# Patient Record
Sex: FEMALE | Race: WHITE | Employment: OTHER | URBAN - METROPOLITAN AREA
[De-identification: names, ages, dates, MRNs, and addresses within clinical notes are randomized per-mention and may not be internally consistent; named-entity substitution may affect disease eponyms.]

---

## 2017-01-18 DIAGNOSIS — D51.9 B12 DEFICIENCY ANEMIA: Primary | ICD-10-CM

## 2017-01-18 PROCEDURE — 96372 THER/PROPH/DIAG INJ SC/IM: CPT | Performed by: FAMILY MEDICINE

## 2017-01-18 NOTE — NURSING NOTE
The following medication was given:     MEDICATION: Vitamin B12  1000 mcg  ROUTE: IM  SITE: Deltoid - Right  DOSE: 1 mL  LOT #: [id number]  :  [facility] [place]  EXPIRATION DATE:  11/2017  NDC#: 9559-2366-76  Cali Matthews MA

## 2017-02-15 DIAGNOSIS — D51.9 B12 DEFICIENCY ANEMIA: Primary | ICD-10-CM

## 2017-02-15 PROCEDURE — 96372 THER/PROPH/DIAG INJ SC/IM: CPT | Performed by: FAMILY MEDICINE

## 2017-02-15 NOTE — NURSING NOTE
The following medication was given:     MEDICATION: Vitamin B12  1000mcg  ROUTE: IM  SITE: Deltoid - Left  DOSE: 1000mcg  LOT #: [id number]  :  [facility] [place]  EXPIRATION DATE:  11/2017  NDC#: 0162-2601-87

## 2017-03-13 DIAGNOSIS — Z76.0 ENCOUNTER FOR MEDICATION REFILL: ICD-10-CM

## 2017-03-13 RX ORDER — LEVOTHYROXINE SODIUM 50 UG/1
50 TABLET ORAL DAILY
Qty: 90 TABLET | Refills: 1 | Status: SHIPPED | OUTPATIENT
Start: 2017-03-13 | End: 2017-09-08

## 2017-03-15 DIAGNOSIS — D51.9 ANEMIA DUE TO VITAMIN B12 DEFICIENCY, UNSPECIFIED B12 DEFICIENCY TYPE: Primary | ICD-10-CM

## 2017-03-15 PROCEDURE — 96372 THER/PROPH/DIAG INJ SC/IM: CPT | Performed by: FAMILY MEDICINE

## 2017-03-15 NOTE — NURSING NOTE
The following medication was given:     MEDICATION: Vitamin B12  1000 mcg  ROUTE: IM  SITE: Deltoid - Right  DOSE: 1 mL  LOT #: [id number]  :  [facility] [place]  EXPIRATION DATE:  03/2018  NDC#: 4423-3992-72  Cali Matthews MA

## 2017-05-18 DIAGNOSIS — D51.9 ANEMIA DUE TO VITAMIN B12 DEFICIENCY, UNSPECIFIED B12 DEFICIENCY TYPE: Primary | ICD-10-CM

## 2017-05-18 PROCEDURE — 96372 THER/PROPH/DIAG INJ SC/IM: CPT | Performed by: FAMILY MEDICINE

## 2017-05-18 NOTE — NURSING NOTE
The following medication was given:     MEDICATION: Vitamin B12  1000mcg  ROUTE: IM  SITE: Deltoid - Right  DOSE: 1000mcg/1mL  LOT #: [id number]  :  [facility] [place]  EXPIRATION DATE:  03/2018  NDC#: 8280-9390-48

## 2017-06-20 DIAGNOSIS — D51.9 ANEMIA DUE TO VITAMIN B12 DEFICIENCY, UNSPECIFIED B12 DEFICIENCY TYPE: ICD-10-CM

## 2017-06-20 PROCEDURE — 96372 THER/PROPH/DIAG INJ SC/IM: CPT | Performed by: FAMILY MEDICINE

## 2017-06-20 NOTE — NURSING NOTE
The following medication was given:     MEDICATION: Vitamin B12  1000 mcg  ROUTE: IM  SITE: Deltoid - Left  DOSE: 1 mL  LOT #: [id number]  :  [facility] [place]  EXPIRATION DATE:  MAR 2018  NDC#: 0001-2275-10    Brittany Trevizo CMA

## 2017-06-26 DIAGNOSIS — I10 BENIGN ESSENTIAL HYPERTENSION: Primary | ICD-10-CM

## 2017-06-26 DIAGNOSIS — Z76.0 ENCOUNTER FOR MEDICATION REFILL: ICD-10-CM

## 2017-06-27 RX ORDER — METOPROLOL SUCCINATE 50 MG/1
50 TABLET, EXTENDED RELEASE ORAL DAILY
Qty: 90 TABLET | Refills: 2 | Status: SHIPPED | OUTPATIENT
Start: 2017-06-27 | End: 2018-04-03

## 2017-07-03 DIAGNOSIS — Z76.0 ENCOUNTER FOR MEDICATION REFILL: ICD-10-CM

## 2017-07-04 RX ORDER — CLOPIDOGREL BISULFATE 75 MG/1
75 TABLET ORAL DAILY
Qty: 90 TABLET | Refills: 0 | Status: SHIPPED | OUTPATIENT
Start: 2017-07-04 | End: 2018-01-02

## 2017-07-18 DIAGNOSIS — D51.9 ANEMIA DUE TO VITAMIN B12 DEFICIENCY, UNSPECIFIED B12 DEFICIENCY TYPE: ICD-10-CM

## 2017-07-18 PROCEDURE — 96372 THER/PROPH/DIAG INJ SC/IM: CPT | Performed by: FAMILY MEDICINE

## 2017-07-18 NOTE — NURSING NOTE
The following medication was given:     MEDICATION: Vitamin B12  1000 mcg  ROUTE: IM  SITE: Deltoid - Right  DOSE: 1 mL  LOT #: [id number]  :  [facility] [place]  EXPIRATION DATE:  03/2018  NDC#: 6102-0934-15  Cali Matthews MA

## 2017-08-21 DIAGNOSIS — D51.9 ANEMIA DUE TO VITAMIN B12 DEFICIENCY, UNSPECIFIED B12 DEFICIENCY TYPE: ICD-10-CM

## 2017-08-21 PROCEDURE — 96372 THER/PROPH/DIAG INJ SC/IM: CPT | Performed by: FAMILY MEDICINE

## 2017-08-21 NOTE — NURSING NOTE
.inj  The following medication was given:     MEDICATION: Vitamin B12  1000mcg  ROUTE: IM  SITE: Deltoid - Left  DOSE: 1.0ml  LOT #: [id number]  :  [facility] [place]  EXPIRATION DATE:  3/2018  NDC#: 7816-2582-54  Aminata Holm MA August 21, 2017 2:00 PM

## 2017-09-01 DIAGNOSIS — E78.2 MIXED HYPERLIPIDEMIA: ICD-10-CM

## 2017-09-01 DIAGNOSIS — D51.9 ANEMIA DUE TO VITAMIN B12 DEFICIENCY, UNSPECIFIED B12 DEFICIENCY TYPE: ICD-10-CM

## 2017-09-01 DIAGNOSIS — E03.9 HYPOTHYROIDISM: ICD-10-CM

## 2017-09-01 DIAGNOSIS — I10 ESSENTIAL HYPERTENSION: Primary | ICD-10-CM

## 2017-09-01 DIAGNOSIS — I10 HTN (HYPERTENSION): ICD-10-CM

## 2017-09-01 PROCEDURE — 84443 ASSAY THYROID STIM HORMONE: CPT | Mod: 90 | Performed by: FAMILY MEDICINE

## 2017-09-01 PROCEDURE — 82607 VITAMIN B-12: CPT | Mod: 90 | Performed by: FAMILY MEDICINE

## 2017-09-01 PROCEDURE — 80048 BASIC METABOLIC PNL TOTAL CA: CPT | Mod: 90 | Performed by: FAMILY MEDICINE

## 2017-09-01 PROCEDURE — 36415 COLL VENOUS BLD VENIPUNCTURE: CPT | Performed by: FAMILY MEDICINE

## 2017-09-01 PROCEDURE — 80061 LIPID PANEL: CPT | Mod: 90 | Performed by: FAMILY MEDICINE

## 2017-09-01 NOTE — LETTER
[facility] [address] [place]  [address]  Phone: [phone]    September 7, 2017      Abbi Crowe  [address]   [facility] [address]              Dear Abbi,    The results from your recent visit showed that all labs look really good.  Continue current medications.      Sincerely,     Darlyn Ching M.D.    Results for orders placed or performed in visit on 09/01/17   Basic Metabolic Panel (8) ([facility])   Result Value Ref Range    Glucose 90 65 - 99 mg/dL    Urea Nitrogen 22 8 - 27 mg/dL    Creatinine 0.94 0.57 - 1.00 mg/dL    eGFR If NonAfricn Am 57 (L) >59 mL/min/1.73    eGFR If Africn Am 66 >59 mL/min/1.73    BUN/Creatinine Ratio 23 12 - 28    Sodium 143 134 - 144 mmol/L    Potassium 4.8 3.5 - 5.2 mmol/L    Chloride 105 96 - 106 mmol/L    Total CO2 23 18 - 28 mmol/L    Calcium 9.6 8.7 - 10.3 mg/dL    Narrative    Performed at:  01 - [facility] [address], [place], [place]  [id number]  : Memo Nair MD, Phone:  [phone]   Lipid Panel ([facility])   Result Value Ref Range    Cholesterol 136 100 - 199 mg/dL    Triglycerides 84 0 - 149 mg/dL    HDL Cholesterol 56 >39 mg/dL    VLDL Cholesterol German 17 5 - 40 mg/dL    LDL Cholesterol Calculated 63 0 - 99 mg/dL    LDL/HDL Ratio 1.1 0.0 - 3.2 ratio units    Narrative    Performed at:  01 - [facility] [address], [place], [place]  [id number]  : Memo Nair MD, Phone:  [phone]   TSH ([facility])   Result Value Ref Range    TSH 3.710 0.450 - 4.500 uIU/mL    Narrative    Performed at:  01 - [facility] [address], [place], [place]  [id number]  : Memo Nair MD, Phone:  [phone]   Vitamin B12 ([facility])   Result Value Ref Range    Vitamin B12 807 211 - 946 pg/mL    Narrative    Performed at:  01 - [facility] [address], [place], [place]  [id number]  : Memo Nair MD, Phone:  [phone]

## 2017-09-02 LAB
BUN SERPL-MCNC: 22 MG/DL (ref 8–27)
BUN/CREATININE RATIO: 23 (ref 12–28)
CALCIUM SERPL-MCNC: 9.6 MG/DL (ref 8.7–10.3)
CHLORIDE SERPLBLD-SCNC: 105 MMOL/L (ref 96–106)
CHOLEST SERPL-MCNC: 136 MG/DL (ref 100–199)
CREAT SERPL-MCNC: 0.94 MG/DL (ref 0.57–1)
EGFR IF AFRICN AM: 66 ML/MIN/1.73
EGFR IF NONAFRICN AM: 57 ML/MIN/1.73
GLUCOSE SERPL-MCNC: 90 MG/DL (ref 65–99)
HDLC SERPL-MCNC: 56 MG/DL
LDL/HDL RATIO: 1.1 RATIO UNITS (ref 0–3.2)
LDLC SERPL CALC-MCNC: 63 MG/DL (ref 0–99)
POTASSIUM SERPL-SCNC: 4.8 MMOL/L (ref 3.5–5.2)
SODIUM SERPL-SCNC: 143 MMOL/L (ref 134–144)
TOTAL CO2: 23 MMOL/L (ref 18–28)
TRIGL SERPL-MCNC: 84 MG/DL (ref 0–149)
TSH BLD-ACNC: 3.71 UIU/ML (ref 0.45–4.5)
VIT B12 SERPL-MCNC: 807 PG/ML (ref 211–946)
VLDLC SERPL CALC-MCNC: 17 MG/DL (ref 5–40)

## 2017-09-08 ENCOUNTER — OFFICE VISIT (OUTPATIENT)
Dept: FAMILY MEDICINE | Facility: CLINIC | Age: 82
End: 2017-09-08

## 2017-09-08 VITALS
TEMPERATURE: 97.8 F | OXYGEN SATURATION: 96 % | WEIGHT: 153 LBS | SYSTOLIC BLOOD PRESSURE: 128 MMHG | HEART RATE: 60 BPM | HEIGHT: 63 IN | BODY MASS INDEX: 27.11 KG/M2 | DIASTOLIC BLOOD PRESSURE: 70 MMHG

## 2017-09-08 DIAGNOSIS — R41.3 MEMORY LOSS: ICD-10-CM

## 2017-09-08 DIAGNOSIS — Z00.00 ROUTINE GENERAL MEDICAL EXAMINATION AT A HEALTH CARE FACILITY: ICD-10-CM

## 2017-09-08 DIAGNOSIS — I49.9 IRREGULAR HEART BEAT: ICD-10-CM

## 2017-09-08 DIAGNOSIS — E78.2 MIXED HYPERLIPIDEMIA: ICD-10-CM

## 2017-09-08 DIAGNOSIS — I10 BENIGN ESSENTIAL HYPERTENSION: ICD-10-CM

## 2017-09-08 DIAGNOSIS — Z23 NEED FOR PROPHYLACTIC VACCINATION AND INOCULATION AGAINST INFLUENZA: Primary | ICD-10-CM

## 2017-09-08 DIAGNOSIS — Z76.0 ENCOUNTER FOR MEDICATION REFILL: ICD-10-CM

## 2017-09-08 DIAGNOSIS — R19.4 CHANGE IN BOWEL HABIT: ICD-10-CM

## 2017-09-08 DIAGNOSIS — Z12.31 ENCOUNTER FOR SCREENING MAMMOGRAM FOR BREAST CANCER: ICD-10-CM

## 2017-09-08 PROCEDURE — G0439 PPPS, SUBSEQ VISIT: HCPCS | Mod: 25 | Performed by: FAMILY MEDICINE

## 2017-09-08 PROCEDURE — 90662 IIV NO PRSV INCREASED AG IM: CPT | Performed by: FAMILY MEDICINE

## 2017-09-08 PROCEDURE — 99214 OFFICE O/P EST MOD 30 MIN: CPT | Mod: 25 | Performed by: FAMILY MEDICINE

## 2017-09-08 PROCEDURE — 93000 ELECTROCARDIOGRAM COMPLETE: CPT | Performed by: FAMILY MEDICINE

## 2017-09-08 PROCEDURE — G0008 ADMIN INFLUENZA VIRUS VAC: HCPCS | Performed by: FAMILY MEDICINE

## 2017-09-08 RX ORDER — ZOLPIDEM TARTRATE 10 MG/1
5 TABLET ORAL
COMMUNITY
End: 2018-10-01

## 2017-09-08 RX ORDER — LEVOTHYROXINE SODIUM 50 UG/1
50 TABLET ORAL DAILY
Qty: 90 TABLET | Refills: 1 | Status: SHIPPED | OUTPATIENT
Start: 2017-09-08 | End: 2018-03-05

## 2017-09-08 NOTE — PROGRESS NOTES
SUBJECTIVE:   Abbi Crowe is a 82 year old female who presents for Preventive Visit.    ISSUES TODAY  1. She is concerned that she had TIA a few weeks ago. She notes that she was driving here to the clinic and temporarily lost memory of driving and almost drove the car through the front of the building. No headache. One prior episode that seemed similar with hospitalization and no conclusion formed. She denies any other neurologic changes.  2. She is concerned about dementia. A few weeks ago she was unable to remember how to turn off her iPad. She uses it every day and could not recall what to do. She has other episodes of memory loss.   3. She has episodes of loss of bowel control that she relates to when she is upset or emotional. She will sometimes wake with urgency to move her bowels.  Are you in the first 12 months of your Medicare Part B coverage?  No    Healthy Habits:    Do you get at least three servings of calcium containing foods daily (dairy, green leafy vegetables, etc.)? yes and no, taking calcium and/or vitamin D supplement: yes     Amount of exercise or daily activities, outside of work: 1 day(s) per week (Pilates type exercise 1x week and walks at [employer] also)    Problems taking medications regularly No    Medication side effects: No    Have you had an eye exam in the past two years? yes    Do you see a dentist twice per year? yes    Do you have sleep apnea, excessive snoring or daytime drowsiness?no    COGNITIVE SCREEN  1) Repeat 3 items (Banana, Sunrise, Chair)    2) Clock draw: NORMAL  3) 3 item recall: Recalls 3 objects  Results: NORMAL clock, 3 items recalled: COGNITIVE IMPAIRMENT LESS LIKELY    Mini-CogTM Copyright TAYLOR Persaud. Licensed by the author for use in [facility]; reprinted with permission (deena@.[facility]). All rights reserved.                Hyperlipidemia Follow-Up      Rate your low fat/cholesterol diet?: good    Taking statin?  Yes, no muscle aches from  statin    Other lipid medications/supplements?:  none    Hypertension Follow-up      Outpatient blood pressures are not being checked.    Low Salt Diet: not monitoring salt    Hypothyroidism Follow-up      Since last visit, patient describes the following symptoms: Weight stable, no hair loss, no skin changes, no constipation, no loose stools            Reviewed and updated as needed this visit by clinical staffTobacco  Allergies  Meds         Reviewed and updated as needed this visit by Provider        Social History   Substance Use Topics     Smoking status: Never Smoker     Smokeless tobacco: Never Used     Alcohol use No       Pt does not drink    Today's PHQ-2 Score:   PHQ-2 ( 1999 [employer]) 9/7/2016 8/28/2015   Q1: Little interest or pleasure in doing things 0 3   Q2: Feeling down, depressed or hopeless 0 1   PHQ-2 Score 0 4         Do you feel safe in your environment - Yes    Do you have a Health Care Directive?: Yes: Patient states has Advance Directive and will bring in a copy to clinic.    Current providers sharing in care for this patient include: Patient Care Team:  Darlyn Ching MD as PCP - General (Family Practice)      Hearing impairment: Yes, pt wears bilateral hearing aids and they are current    Ability to successfully perform activities of daily living: Yes, no assistance needed     Fall risk:  Fall Risk Assessment not completed.    Home safety:  none identified      The following health maintenance items are reviewed in Epic and correct as of today:  Health Maintenance   Topic Date Due     DEXA Q2 YR  08/22/2014     FALL RISK ASSESSMENT  08/28/2016     INFLUENZA VACCINE (SYSTEM ASSIGNED)  09/01/2017     TSH Q1 YEAR  09/01/2018     BMP Q1 YR  09/01/2018     LIPID MONITORING Q1 YEAR  09/01/2018     ADVANCE DIRECTIVE PLANNING Q5 YRS  08/28/2020     TETANUS IMMUNIZATION (SYSTEM ASSIGNED)  08/28/2025     PNEUMOCOCCAL  Completed     Labs reviewed in EPIC  BP Readings from Last 3 Encounters:    09/08/17 128/70   09/07/16 128/70   03/11/16 122/60    Wt Readings from Last 3 Encounters:   09/08/17 69.4 kg (153 lb)   09/07/16 69.9 kg (154 lb)   03/11/16 69.6 kg (153 lb 6.4 oz)                  Patient Active Problem List   Diagnosis     Allergic rhinitis     Essential hypertension     Mixed hyperlipidemia     B12 deficiency anemia     Preventative health care     Health Care Home     Advanced directives, counseling/discussion     HTN (hypertension)     Dyslipidemia     Hypothyroidism     Aortic valve prosthesis present     Past Surgical History:   Procedure Laterality Date     AORTIC VALVE REPLACEMENT      1 vessel CABG     D & C       PHACOEMULSIFICATION CLEAR CORNEA WITH STANDARD INTRAOCULAR LENS IMPLANT  6/6/2012    Procedure:PHACOEMULSIFICATION CLEAR CORNEA WITH STANDARD INTRAOCULAR LENS IMPLANT; LEFT PHACOEMULSIFICATION CLEAR CORNEA WITH STANDARD INTRAOCULAR LENS IMPLANT ; Surgeon:LUIS MANCINI; Location:[facility]       Social History   Substance Use Topics     Smoking status: Never Smoker     Smokeless tobacco: Never Used     Alcohol use No     Family History   Problem Relation Age of Onset     HEART DISEASE Mother      CHF     HEART DISEASE Father      Endocrine Disease Brother      CANCER Sister      esophageal      HEART DISEASE Brother      OSTEOPOROSIS Sister          Current Outpatient Prescriptions   Medication Sig Dispense Refill     zolpidem (AMBIEN) 10 MG tablet Take 5 mg by mouth nightly as needed for sleep       clopidogrel (PLAVIX) 75 MG tablet Take 1 tablet (75 mg) by mouth daily 90 tablet 0     metoprolol (TOPROL-XL) 50 MG 24 hr tablet Take 1 tablet (50 mg) by mouth daily 90 tablet 2     levothyroxine (SYNTHROID/LEVOTHROID) 50 MCG tablet Take 1 tablet (50 mcg) by mouth daily 90 tablet 1     Azelastine HCl 0.15 % SOLN Spray 2 sprays in nostril 2 times daily  3     Lactobacillus (ACIDOPHILUS) CAPS Take 1 capsule by mouth daily       Cholecalciferol (VITAMIN D3 PO) Take 2,000 Units by mouth  daily       atorvastatin (LIPITOR) 20 MG tablet Take 20 mg by mouth daily       cyanocobalamin 1000 MCG/ML injection Inject 1 mL (1,000 mcg) into the muscle every 30 days (Patient taking differently: Inject 1 mL into the muscle every 30 days Last received 3/7/16) 3 mL 2     multivitamin (OCUVITE) TABS Take 1 tablet by mouth daily.       aspirin 81 MG tablet Take 1 tablet by mouth daily.       calcium-vitamin D (CALTRATE 600+D) 600-400 MG-UNIT per tablet Take 1 tablet by mouth 2 times daily.       loratadine (CLARITIN) 10 MG tablet Take 10 mg by mouth daily       ALLERGY SHOTS Once a month or PRN       Allergies   Allergen Reactions     Yellow Dye Itching     Recent Labs   Lab Test  09/01/17   0905  08/31/16   0911   11/19/15   1018  08/21/15   1049   08/19/14   1132  08/21/12   LDL  63  75   --   63  73   < >  66   < >  64   HDL  56  53   --   51  52   < >  56   < >  52   TRIG  84  103   --   103  99   < >  90   < >  104   ALT   --   19   --    --   10   --   15   < >   --    CR  0.94  0.99   < >   --   1.02*   --   0.92   < >   --    POTASSIUM  4.8  5.0   < >   --   5.0   --   4.7   < >   --    TSH   --    --    --    --    --    --    --    --   2.20    < > = values in this interval not displayed.              Mammogram Screening: Patient over age 75, has elected to continue with mammography screening.    ROS:  C: NEGATIVE for fever, chills, change in weight  I: NEGATIVE for worrisome rashes, moles or lesions  E: NEGATIVE for vision changes or irritation  E/M: NEGATIVE for ear, mouth and throat problems  R: NEGATIVE for significant cough or SOB  B: NEGATIVE for masses, tenderness or discharge  CV: NEGATIVE for chest pain, palpitations or peripheral edema  GI: NEGATIVE for nausea, abdominal pain, heartburn, or change in bowel habits  : NEGATIVE for frequency, dysuria, or hematuria  M: NEGATIVE for significant arthralgias or myalgia  N: POSITIVE as above  E: NEGATIVE for temperature intolerance, skin/hair  "changes  H: NEGATIVE for bleeding problems  P: NEGATIVE for changes in mood or affect  PSYCHIATRIC: she is concerned about dementia. She notes a sister with bad memory issues. She has trouble remembering things.     OBJECTIVE:   /70 (Cuff Size: Adult Regular)  Pulse 60  Temp 97.8  F (36.6  C) (Oral)  Ht 1.588 m (5' 2.5\")  Wt 69.4 kg (153 lb)  SpO2 96%  BMI 27.54 kg/m2 Estimated body mass index is 27.54 kg/(m^2) as calculated from the following:    Height as of this encounter: 1.588 m (5' 2.5\").    Weight as of this encounter: 69.4 kg (153 lb).  EXAM:   GENERAL APPEARANCE: healthy, alert and no distress  EYES: Eyes grossly normal to inspection, PERRL and conjunctivae and sclerae normal  HENT: ear canals and TM's normal, nose and mouth without ulcers or lesions, oropharynx clear and oral mucous membranes moist  NECK: no adenopathy, no asymmetry, masses, or scars and thyroid normal to palpation  RESP: lungs clear to auscultation - no rales, rhonchi or wheezes  BREAST: normal without masses, tenderness or nipple discharge and no palpable axillary masses or adenopathy  CV: regular rate and rhythm, normal S1 S2, no S3 or S4, no murmur, click or rub, no peripheral edema and peripheral pulses strong  ABDOMEN: soft, nontender, no hepatosplenomegaly, no masses and bowel sounds normal   (female): normal female external genitalia, normal urethral meatus, vaginal mucosal atrophy noted, normal cervix, adnexae, and uterus without masses or abnormal discharge  MS: no musculoskeletal defects are noted and gait is age appropriate without ataxia  SKIN: no suspicious lesions or rashes  NEURO: Normal strength and tone, sensory exam grossly normal, mentation intact and speech normal  PSYCH: affect normal  EKG: sinus rhythm, LBBB      ASSESSMENT / PLAN:   Abbi was seen today for physical and flu shot.    Diagnoses and all orders for this visit:    Need for prophylactic vaccination and inoculation against influenza  -    " " FLU VACCINE, INCREASED ANTIGEN, PRESV FREE, AGE 65+ [72616]  -     ADMIN INFLUENZA (For MEDICARE Patients ONLY) []    Routine general medical examination at a health care facility    Mixed hyperlipidemia  Continue atorvastatin.     Change in bowel habit  If persistent, consider repeat colonoscopy.    Memory loss  -     Referral to [facility] for head CT and carotid ultrasound. If normal, treat for presumed Alzheimer's dementia.     Benign essential hypertension  Continue metoprolol.     Irregular heart beat  -     EKG 12-lead complete w/read - Clinics  Sinus rhythm with LBBB, no previous EKG available for comparison.   She is scheduled to see cardiology next week.     Encounter for screening mammogram for breast cancer  -     MAMMO -  Screening Digital Bilateral (FUTURE/[facility]); Future        End of Life Planning:  Patient currently has an advanced directive: No.  I have verified the patient's ablity to prepare an advanced directive/make health care decisions.  Literature was provided to assist patient in preparing an advanced directive.    COUNSELING:  Reviewed preventive health counseling, as reflected in patient instructions       Regular exercise       Healthy diet/nutrition       Vision screening       Hearing screening       Osteoporosis Prevention/Bone Health       Colon cancer screening        Estimated body mass index is 27.54 kg/(m^2) as calculated from the following:    Height as of this encounter: 1.588 m (5' 2.5\").    Weight as of this encounter: 69.4 kg (153 lb).     reports that she has never smoked. She has never used smokeless tobacco.        Appropriate preventive services were discussed with this patient, including applicable screening as appropriate for cardiovascular disease, diabetes, osteopenia/osteoporosis, and glaucoma.  As appropriate for age/gender, discussed screening for colorectal cancer, prostate cancer, breast cancer, and cervical cancer. Checklist reviewing " preventive services available has been given to the patient.    Reviewed patients plan of care and provided an AVS. The Basic Care Plan (routine screening as documented in Health Maintenance) for Abbi meets the Care Plan requirement. This Care Plan has been established and reviewed with the Patient.    Counseling Resources:  ATP IV Guidelines  Pooled Cohorts Equation Calculator  Breast Cancer Risk Calculator  FRAX Risk Assessment  ICSI Preventive Guidelines  Dietary Guidelines for Americans, 2010  [employer]'s MyPlate  ASA Prophylaxis  Lung CA Screening    Darlyn Ching MD  [facility] Psych[facility] HospitalInjectable Influenza Immunization Documentation    1.  Are you sick today? (Fever of 100.5 or higher on the day of the clinic)   No    2.  Have you ever had Guillain-Westminster Syndrome within 6 weeks of an influenza vaccionation?  No    3. Do you have a life-threatening allergy to eggs?  No    4. Do you have a life-threatening allergy to a component of the vaccine? May include antibiotics, gelatin or latex.  No     5. Have you ever had a reaction to a dose of flu vaccine that needed immediate medical attention?  No     Form completed by Paula Peng CMA

## 2017-09-08 NOTE — MR AVS SNAPSHOT
After Visit Summary   9/8/2017    Abbi Crowe    MRN: [id number]           Patient Information     Date Of Birth          1935        Visit Information        Provider Department      9/8/2017 1:15 PM Darlyn Ching MD [facility]        Today's Diagnoses     Need for prophylactic vaccination and inoculation against influenza    -  1    Routine general medical examination at a health care facility        Mixed hyperlipidemia        Change in bowel habit        Memory loss        Benign essential hypertension        Irregular heart beat        Encounter for screening mammogram for breast cancer          Care Instructions      Preventive Health Recommendations  Female Ages 65 +    Yearly exam:     See your health care provider every year in order to  o Review health changes.   o Discuss preventive care.    o Review your medicines if your doctor has prescribed any.      You no longer need a yearly Pap test unless you've had an abnormal Pap test in the past 10 years. If you have vaginal symptoms, such as bleeding or discharge, be sure to talk with your provider about a Pap test.      Every 1 to 2 years, have a mammogram.  If you are over 69, talk with your health care provider about whether or not you want to continue having screening mammograms.      Every 10 years, have a colonoscopy. Or, have a yearly FIT test (stool test). These exams will check for colon cancer.       Have a cholesterol test every 5 years, or more often if your doctor advises it.       Have a diabetes test (fasting glucose) every three years. If you are at risk for diabetes, you should have this test more often.       At age 65, have a bone density scan (DEXA) to check for osteoporosis (brittle bone disease).    Shots:    Get a flu shot each year.    Get a tetanus shot every 10 years.    Talk to your doctor about your pneumonia vaccines. There are now two you should receive - Pneumovax (PPSV 23) and  Prevnar (PCV 13).    Talk to your doctor about the shingles vaccine.    Talk to your doctor about the hepatitis B vaccine.    Nutrition:     Eat at least 5 servings of fruits and vegetables each day.      Eat whole-grain bread, whole-wheat pasta and brown rice instead of white grains and rice.      Talk to your provider about Calcium and Vitamin D.     Lifestyle    Exercise at least 150 minutes a week (30 minutes a day, 5 days a week). This will help you control your weight and prevent disease.      Limit alcohol to one drink per day.      No smoking.       Wear sunscreen to prevent skin cancer.       See your dentist twice a year for an exam and cleaning.      See your eye doctor every 1 to 2 years to screen for conditions such as glaucoma, macular degeneration, cataracts, etc   Preventive Health Recommendations    Female Ages 65 +    Yearly exam:   See your health care provider every year in order to  Review health changes.   Discuss preventive care.    Review your medicines if your doctor has prescribed any.    You no longer need a yearly Pap test unless you've had an abnormal Pap test in the past 10 years. If you have vaginal symptoms, such as bleeding or discharge, be sure to talk with your provider about a Pap test.    Every 1 to 2 years, have a mammogram.  If you are over 69, talk with your health care provider about whether or not you want to continue having screening mammograms.    Every 10 years, have a colonoscopy. Or, have a yearly FIT test (stool test). These exams will check for colon cancer.     Have a cholesterol test every 5 years, or more often if your doctor advises it.     Have a diabetes test (fasting glucose) every three years. If you are at risk for diabetes, you should have this test more often.     At age 65, have a bone density scan (DEXA) to check for osteoporosis (brittle bone disease).    Shots:  Get a flu shot each year.  Get a tetanus shot every 10 years.  Talk to your doctor about  your pneumonia vaccines. There are now two you should receive - Pneumovax (PPSV 23) and Prevnar (PCV 13).  Talk to your doctor about the shingles vaccine.  Talk to your doctor about the hepatitis B vaccine.    Nutrition:   Eat at least 5 servings of fruits and vegetables each day.    Eat whole-grain bread, whole-wheat pasta and brown rice instead of white grains and rice.    Talk to your provider about Calcium and Vitamin D.     Lifestyle  Exercise at least 150 minutes a week (30 minutes a day, 5 days a week). This will help you control your weight and prevent disease.    Limit alcohol to one drink per day.    No smoking.     Wear sunscreen to prevent skin cancer.     See your dentist twice a year for an exam and cleaning.    See your eye doctor every 1 to 2 years to screen for conditions such as glaucoma, macular degeneration and cataracts.          Follow-ups after your visit        Additional Services     [facility]       Referral to [facility]  Phone: [phone]  Fax: [phone]  Reason for referral: CT OF BRAIN AND CAROTID U/S                  Your next 10 appointments already scheduled     Sep 13, 2017  1:00 PM CDT   MA SCREENING DIGITAL BILATERAL with SHBCMA6   [facility] ([facility])    [address] [place] [address], [address] [place] [address] [phone]           Do not use any powder, lotion or deodorant under your arms or on your breast. If you do, we will ask you to remove it before your exam.  Wear comfortable, two-piece clothing.  If you have any allergies, tell your care team.  Bring any previous mammograms from other facilities or have them mailed to the breast center. Three-dimensional (3D) mammograms are available at [place] locations in [facility], and [place]. Health locations include [facility] in [place]. Benefits of 3D mammograms include: -  "Improved rate of cancer detection - Decreases your chance of having to go back for more tests, which means fewer: - \"False-positive\" results (This means that there is an abnormal area but it isn't cancer.) - Invasive testing procedures, such as a biopsy or surgery - Can provide clearer images of the breast if you have dense breast tissue. 3D mammography is an optional exam that anyone can have with a 2D mammogram. It doesn't replace or take the place of a 2D mammogram. 2D mammograms remain an effective screening test for all women.  Not all insurance companies cover the cost of a 3D mammogram. Check with your insurance.            Sep 21, 2017  1:30 PM CDT   LAB with [employer] [facility]   [facility] ([facility])    [address] [place] [address] [phone]           Patient must bring picture ID. Patient should be prepared to give a urine specimen  Please do not eat 10-12 hours before your appointment if you are coming in fasting for labs on lipids, cholesterol, or glucose (sugar). Pregnant women should follow their Care Team instructions. Water with medications is okay. Do not drink coffee or other fluids. If you have concerns about taking  your medications, please ask at office or if scheduling via [employer], send a message by clicking on Secure Messaging, Message Your Care Team.            Oct 20, 2017  9:45 AM CDT   LAB with [facility] LAB   [facility] ([facility])    [address] [place] [id number] [phone]           Patient must bring picture ID. Patient should be prepared to give a urine specimen  Please do not eat 10-12 hours before your appointment if you are coming in fasting for labs on lipids, cholesterol, or glucose (sugar). Pregnant women should follow their Care Team instructions. Water with medications is okay. Do not drink coffee or other fluids. If you have concerns about taking  your medications, please ask at office or if " scheduling via [employer], send a message by clicking on Secure Messaging, Message Your Care Team.            Nov 21, 2017  1:30 PM CST   [facility] with [facility]   [facility]    [address] [place] [address] [phone]           Patient must bring picture ID. Patient should be prepared to give a urine specimen  Please do not eat 10-12 hours before your appointment if you are coming in fasting for labs on lipids, cholesterol, or glucose (sugar). Pregnant women should follow their Care Team instructions. Water with medications is okay. Do not drink coffee or other fluids. If you have concerns about taking  your medications, please ask at office or if scheduling via [employer], send a message by clicking on Secure Messaging, Message Your Care Team.            Dec 21, 2017  1:30 PM CST   LAB with [facility] LAB   [facility]    [address] [place] [address] [phone]           Patient must bring picture ID. Patient should be prepared to give a urine specimen  Please do not eat 10-12 hours before your appointment if you are coming in fasting for labs on lipids, cholesterol, or glucose (sugar). Pregnant women should follow their Care Team instructions. Water with medications is okay. Do not drink coffee or other fluids. If you have concerns about taking  your medications, please ask at office or if scheduling via [employer], send a message by clicking on Secure Messaging, Message Your Care Team.              Future tests that were ordered for you today     Open Future Orders        Priority Expected Expires Ordered    MAMMO -  Screening Digital Bilateral (FUTURE/[facility]) Routine  9/8/2018 9/8/2017            Who to contact     If you have questions or need follow up information about today's clinic visit or your schedule please contact [facility] directly at [phone].  Normal or non-critical lab and imaging  "results will be communicated to you by MyChart, letter or phone within 4 business days after the clinic has received the results. If you do not hear from us within 7 days, please contact the clinic through [employer] or phone. If you have a critical or abnormal lab result, we will notify you by phone as soon as possible.  Submit refill requests through [employer] or call your pharmacy and they will forward the refill request to us. Please allow 3 business days for your refill to be completed.          Additional Information About Your Visit        [employer] Information     [employer] lets you send messages to your doctor, view your test results, renew your prescriptions, schedule appointments and more. To sign up, go to [email or web address] . Click on \"Log in\" on the left side of the screen, which will take you to the Welcome page. Then click on \"Sign up Now\" on the right side of the page.     You will be asked to enter the access code listed below, as well as some personal information. Please follow the directions to create your username and password.     Your access code is: [email or web address]  Expires: 2017  5:49 PM     Your access code will  in 90 days. If you need help or a new code, please call your [place] clinic or [phone].        Care EveryWhere ID     This is your Care EveryWhere ID. This could be used by other organizations to access your [place] medical records  [id number]        Your Vitals Were     Pulse Temperature Height Pulse Oximetry BMI (Body Mass Index)       60 97.8  F (36.6  C) (Oral) 1.588 m (5' 2.5\") 96% 27.54 kg/m2        Blood Pressure from Last 3 Encounters:   17 128/70   16 128/70   16 122/60    Weight from Last 3 Encounters:   17 69.4 kg (153 lb)   16 69.9 kg (154 lb)   16 69.6 kg (153 lb 6.4 oz)              We Performed the Following     ADMIN INFLUENZA (For MEDICARE Patients ONLY) []     EKG 12-lead complete w/read - Clinics     FLU " VACCINE, INCREASED ANTIGEN, PRESV FREE, AGE 65+ [83451]     [facility]          Today's Medication Changes          These changes are accurate as of: 9/8/17  5:49 PM.  If you have any questions, ask your nurse or doctor.               These medicines have changed or have updated prescriptions.        Dose/Directions    cyanocobalamin 1000 MCG/ML injection   Commonly known as:  VITAMIN B12   This may have changed:  additional instructions   Used for:  Vitamin B12 deficiency (non anemic)        Dose:  1 mL   Inject 1 mL (1,000 mcg) into the muscle every 30 days   Quantity:  3 mL   Refills:  2                Primary Care Provider Office Phone # Fax #    Darlyn Teresa Ching [phone]       [facility] [address]  [facility] [address]        Equal Access to Services     MIGNON CASTILLO AH: Hadii jewels atwood hadasho Soomaali, waaxda luqadaha, qaybta kaalmada adeegyada, dagmar serrano . So [facility] [phone].    ATENCIÓN: Si habla español, tiene a shaikh disposición servicios gratuitos de asistencia lingüística. Llame al [phone].    We comply with applicable federal civil rights laws and [place] laws. We do not discriminate on the basis of race, color, national origin, age, disability sex, sexual orientation or gender identity.            Thank you!     Thank you for choosing [facility]  for your care. Our goal is always to provide you with excellent care. Hearing back from our patients is one way we can continue to improve our services. Please take a few minutes to complete the written survey that you may receive in the mail after your visit with us. Thank you!             Your Updated Medication List - Protect others around you: Learn how to safely use, store and throw away your medicines at www.disposemymeds.org.          This list is accurate as of: 9/8/17  5:49 PM.  Always use your most recent med list.                   Brand Name  Dispense Instructions for use Diagnosis    acidophilus Caps      Take 1 capsule by mouth daily    Slow transit constipation       ALLERGEN IMMUNOTHERAPY PRESCRIPTION      Once a month or PRN    Allergic rhinitis, cause unspecified       aspirin 81 MG tablet      Take 1 tablet by mouth daily.        atorvastatin 20 MG tablet    LIPITOR     Take 20 mg by mouth daily        Azelastine HCl 0.15 % Soln      Spray 2 sprays in nostril 2 times daily        CALTRATE 600+D 600-400 MG-UNIT per tablet   Generic drug:  calcium-vitamin D      Take 1 tablet by mouth 2 times daily.        clopidogrel 75 MG tablet    PLAVIX    90 tablet    Take 1 tablet (75 mg) by mouth daily    Encounter for medication refill       cyanocobalamin 1000 MCG/ML injection    VITAMIN B12    3 mL    Inject 1 mL (1,000 mcg) into the muscle every 30 days    Vitamin B12 deficiency (non anemic)       levothyroxine 50 MCG tablet    SYNTHROID/LEVOTHROID    90 tablet    Take 1 tablet (50 mcg) by mouth daily    Encounter for medication refill       loratadine 10 MG tablet    CLARITIN     Take 10 mg by mouth daily        metoprolol 50 MG 24 hr tablet    TOPROL-XL    90 tablet    Take 1 tablet (50 mg) by mouth daily    Benign essential hypertension       multivitamin Tabs tablet      Take 1 tablet by mouth daily.        VITAMIN D3 PO      Take 2,000 Units by mouth daily        zolpidem 10 MG tablet    AMBIEN     Take 5 mg by mouth nightly as needed for sleep

## 2017-09-13 ENCOUNTER — TRANSFERRED RECORDS (OUTPATIENT)
Dept: FAMILY MEDICINE | Facility: CLINIC | Age: 82
End: 2017-09-13

## 2017-09-13 ENCOUNTER — HOSPITAL ENCOUNTER (OUTPATIENT)
Dept: MAMMOGRAPHY | Facility: CLINIC | Age: 82
Discharge: HOME | End: 2017-09-13
Attending: FAMILY MEDICINE | Admitting: FAMILY MEDICINE
Payer: MEDICARE

## 2017-09-13 DIAGNOSIS — Z12.31 ENCOUNTER FOR SCREENING MAMMOGRAM FOR BREAST CANCER: ICD-10-CM

## 2017-09-13 PROCEDURE — G0202 SCR MAMMO BI INCL CAD: HCPCS

## 2017-09-15 ENCOUNTER — TELEPHONE (OUTPATIENT)
Dept: FAMILY MEDICINE | Facility: CLINIC | Age: 82
End: 2017-09-15

## 2017-09-15 DIAGNOSIS — R41.3 MEMORY CHANGES: Primary | ICD-10-CM

## 2017-09-15 RX ORDER — MEMANTINE HYDROCHLORIDE 5 MG/1
5 TABLET ORAL DAILY
Qty: 30 TABLET | Refills: 1 | Status: SHIPPED | OUTPATIENT
Start: 2017-09-15 | End: 2017-10-24

## 2017-09-15 NOTE — TELEPHONE ENCOUNTER
----- Message from Shahla Neri MA sent at 9/14/2017  1:38 PM CDT -----  Regarding: please call patient with new med  Thanks! Sarah  ----- Message -----     From: Darlyn Ching MD     Sent: 9/13/2017   4:27 PM       To: [facility] Triage    No evidence of stroke.  It would be appropriate for trial of Alzheimer's medication.  Namenda 5 mg BID #60 1 RF, recheck in 6 weeks.

## 2017-09-15 NOTE — TELEPHONE ENCOUNTER
Patient returned phone call for results.  Negative results CT given-per Dr. Ching discuss adding Namenda 5mg BID for memeory-patient agrees and will start this.  Also patient given results carotid artery U/S.  Negative mammo results also given.  Patient advised to RTC in 6 weeks for recheck memory, she will call to schedule this.  Courtney Viera

## 2017-09-21 DIAGNOSIS — D51.9 ANEMIA DUE TO VITAMIN B12 DEFICIENCY, UNSPECIFIED B12 DEFICIENCY TYPE: ICD-10-CM

## 2017-09-21 PROCEDURE — 96372 THER/PROPH/DIAG INJ SC/IM: CPT | Performed by: FAMILY MEDICINE

## 2017-10-20 ENCOUNTER — OFFICE VISIT (OUTPATIENT)
Dept: FAMILY MEDICINE | Facility: CLINIC | Age: 82
End: 2017-10-20

## 2017-10-20 VITALS
HEART RATE: 64 BPM | DIASTOLIC BLOOD PRESSURE: 80 MMHG | WEIGHT: 153 LBS | BODY MASS INDEX: 27.54 KG/M2 | OXYGEN SATURATION: 96 % | SYSTOLIC BLOOD PRESSURE: 130 MMHG

## 2017-10-20 DIAGNOSIS — R30.0 DYSURIA: Primary | ICD-10-CM

## 2017-10-20 DIAGNOSIS — D51.9 ANEMIA DUE TO VITAMIN B12 DEFICIENCY, UNSPECIFIED B12 DEFICIENCY TYPE: ICD-10-CM

## 2017-10-20 LAB
BACTERIA URINE: NORMAL
BILIRUB UR QL STRIP: NORMAL
BLOOD URINE DIP: NORMAL
CASTS/LPF: NORMAL
COLOR UR: YELLOW
CRYSTAL URINE: NORMAL
EPITHELIAL CELLS - QUEST: NORMAL
GLUCOSE UR STRIP-MCNC: 0 MG/DL
KETONES UR QL STRIP: NORMAL
LEUKOCYTE ESTERASE URINE DIP: NORMAL
MUCOUS URINE: NORMAL
NITRITE UR QL STRIP: NORMAL
PH UR STRIP: 6 PH (ref 5–9)
PROT UR QL: NORMAL MG/DL (ref ?–0.01)
RBC URINE: NORMAL (ref 0–3)
SP GR UR STRIP: 1.02 (ref 1–1.02)
UROBILINOGEN UR QL STRIP: 1 EU/DL (ref 0.2–1)
WBC URINE: NORMAL (ref 0–3)

## 2017-10-20 PROCEDURE — 96372 THER/PROPH/DIAG INJ SC/IM: CPT | Performed by: FAMILY MEDICINE

## 2017-10-20 PROCEDURE — 81003 URINALYSIS AUTO W/O SCOPE: CPT | Performed by: FAMILY MEDICINE

## 2017-10-20 PROCEDURE — 99213 OFFICE O/P EST LOW 20 MIN: CPT | Mod: 25 | Performed by: FAMILY MEDICINE

## 2017-10-20 NOTE — MR AVS SNAPSHOT
After Visit Summary   10/20/2017    Abbi Crowe    MRN: [id number]           Patient Information     Date Of Birth          1935        Visit Information        Provider Department      10/20/2017 8:15 AM Cristian Ramirez MD [facility]        Today's Diagnoses     Dysuria    -  1    Anemia due to vitamin B12 deficiency, unspecified B12 deficiency type           Follow-ups after your visit        Your next 10 appointments already scheduled     Oct 24, 2017  9:45 AM CDT   Office Visit with Darlyn Ching MD   [facility] ([facility])    [address] [place] [id number] [phone]           Bring a current list of meds and any records pertaining to this visit. For Physicals, please bring immunization records and any forms needing to be filled out. Please arrive 10 minutes early to complete paperwork.            Nov 21, 2017  1:30 PM CST   LAB with RF LAB   [facility] ([facility])    [address] [place] [address] [phone]           Patient must bring picture ID. Patient should be prepared to give a urine specimen  Please do not eat 10-12 hours before your appointment if you are coming in fasting for labs on lipids, cholesterol, or glucose (sugar). Pregnant women should follow their Care Team instructions. Water with medications is okay. Do not drink coffee or other fluids. If you have concerns about taking  your medications, please ask at office or if scheduling via [employer], send a message by clicking on Secure Messaging, Message Your Care Team.            Dec 21, 2017  1:30 PM CST   LAB with RF LAB   [facility] ([facility])    [address] [place] [id number] [phone]           Patient must bring picture ID. Patient should be prepared to give a urine specimen  Please do not eat 10-12 hours before your appointment if you are coming in  "fasting for labs on lipids, cholesterol, or glucose (sugar). Pregnant women should follow their Care Team instructions. Water with medications is okay. Do not drink coffee or other fluids. If you have concerns about taking  your medications, please ask at office or if scheduling via Shipping Easy, send a message by clicking on Secure Messaging, Message Your Care Team.              Who to contact     If you have questions or need follow up information about today's clinic visit or your schedule please contact [facility] directly at [phone].  Normal or non-critical lab and imaging results will be communicated to you by Shipping Easy, letter or phone within 4 business days after the clinic has received the results. If you do not hear from us within 7 days, please contact the clinic through Shipping Easy or phone. If you have a critical or abnormal lab result, we will notify you by phone as soon as possible.  Submit refill requests through [employer] Easy or call your pharmacy and they will forward the refill request to us. Please allow 3 business days for your refill to be completed.          Additional Information About Your Visit        Shipping Easy Information     Shipping Easy lets you send messages to your doctor, view your test results, renew your prescriptions, schedule appointments and more. To sign up, go to [email or web address] . Click on \"Log in\" on the left side of the screen, which will take you to the Welcome page. Then click on \"Sign up Now\" on the right side of the page.     You will be asked to enter the access code listed below, as well as some personal information. Please follow the directions to create your username and password.     Your access code is: [email or web address]  Expires: 2017  5:49 PM     Your access code will  in 90 days. If you need help or a new code, please call your [place] clinic or [phone].        Care EveryWhere ID     This is your Care EveryWhere ID. This could be used by other " organizations to access your [place] medical records  [id number]        Your Vitals Were     Pulse Pulse Oximetry BMI (Body Mass Index)             64 96% 27.54 kg/m2          Blood Pressure from Last 3 Encounters:   10/20/17 130/80   09/08/17 128/70   09/07/16 128/70    Weight from Last 3 Encounters:   10/20/17 69.4 kg (153 lb)   09/08/17 69.4 kg (153 lb)   09/07/16 69.9 kg (154 lb)              We Performed the Following     THER/PROPH/DIAG INJ, SC/IM     Urinalysis w/reflex protein, bili ([facility])     Urine Culture  Routine ([facility])     VITAMIN B12 INJ /1000MCG  (** ADD QUANTITY **)          Today's Medication Changes          These changes are accurate as of: 10/20/17  1:06 PM.  If you have any questions, ask your nurse or doctor.               These medicines have changed or have updated prescriptions.        Dose/Directions    cyanocobalamin 1000 MCG/ML injection   Commonly known as:  VITAMIN B12   This may have changed:  additional instructions   Used for:  Vitamin B12 deficiency (non anemic)        Dose:  1 mL   Inject 1 mL (1,000 mcg) into the muscle every 30 days   Quantity:  3 mL   Refills:  2                Primary Care Provider Office Phone # Fax #    Darlyn Teresa Ching [phone]       [facility] [address] [facility] [address]        Equal Access to Services     MIGNON CASTILLO AH: Hadii jewels atwood hadasho Sosaravanan, waaxda luqadaha, qaybta kaalmada adeegyada, dagmar serrano . So [facility] [phone].    ATENCIÓN: Si habla español, tiene a shaikh disposición servicios gratuitos de asistencia lingüística. Llame al [phone].    We comply with applicable federal civil rights laws and [place] laws. We do not discriminate on the basis of race, color, national origin, age, disability, sex, sexual orientation, or gender identity.            Thank you!     Thank you for choosing [facility]  for your care. Our goal is always to provide you  with excellent care. Hearing back from our patients is one way we can continue to improve our services. Please take a few minutes to complete the written survey that you may receive in the mail after your visit with us. Thank you!             Your Updated Medication List - Protect others around you: Learn how to safely use, store and throw away your medicines at www.disposemymeds.org.          This list is accurate as of: 10/20/17  1:06 PM.  Always use your most recent med list.                   Brand Name Dispense Instructions for use Diagnosis    acidophilus Caps      Take 1 capsule by mouth daily    Slow transit constipation       ALLERGEN IMMUNOTHERAPY PRESCRIPTION      Once a month or PRN    Allergic rhinitis, cause unspecified       aspirin 81 MG tablet      Take 1 tablet by mouth daily.        atorvastatin 20 MG tablet    LIPITOR     Take 20 mg by mouth daily        Azelastine HCl 0.15 % Soln      Spray 2 sprays in nostril 2 times daily        CALTRATE 600+D 600-400 MG-UNIT per tablet   Generic drug:  calcium-vitamin D      Take 1 tablet by mouth 2 times daily.        clopidogrel 75 MG tablet    PLAVIX    90 tablet    Take 1 tablet (75 mg) by mouth daily    Encounter for medication refill       cyanocobalamin 1000 MCG/ML injection    VITAMIN B12    3 mL    Inject 1 mL (1,000 mcg) into the muscle every 30 days    Vitamin B12 deficiency (non anemic)       levothyroxine 50 MCG tablet    SYNTHROID/LEVOTHROID    90 tablet    Take 1 tablet (50 mcg) by mouth daily    Encounter for medication refill       loratadine 10 MG tablet    CLARITIN     Take 10 mg by mouth daily        memantine 5 MG tablet    NAMENDA    30 tablet    Take 1 tablet (5 mg) by mouth daily    Memory changes       metoprolol 50 MG 24 hr tablet    TOPROL-XL    90 tablet    Take 1 tablet (50 mg) by mouth daily    Benign essential hypertension       multivitamin Tabs tablet      Take 1 tablet by mouth daily.        VITAMIN D3 PO      Take 2,000  Units by mouth daily        zolpidem 10 MG tablet    AMBIEN     Take 5 mg by mouth nightly as needed for sleep

## 2017-10-20 NOTE — PROGRESS NOTES
SUBJECTIVE: Abbi Crowe is a 82 year old female who complains of urinary frequency, urgency and dysuria x 2 days, without flank pain, fever, chills, or abnormal vaginal discharge or bleeding. She is better today    OBJECTIVE: Appears well, in no apparent distress.  Vital signs are normal. The abdomen is soft without tenderness, guarding, mass, rebound or organomegaly. No CVA tenderness or inguinal adenopathy noted. UA RESULTS:  Recent Labs   Lab Test  10/20/17   0807   COLOR  Yellow   SG  1.020   URINEPH  6.0   UROBILINOGEN  1    NITRITE  Pos       ASSESSMENT: poss UTI, but NS for micro. UC pending    PLAN: observe for now. B12 injection also.

## 2017-10-20 NOTE — NURSING NOTE
The following medication was given:     MEDICATION: Vitamin B12  1000mcg  ROUTE: IM  SITE: Deltoid - Left  DOSE: 1mL  LOT #: [id number]  :  [employer] [place]  EXPIRATION DATE:  03/2018  NDC#: 5715-3047-78  CARMELO MCLEAN MA

## 2017-10-23 ENCOUNTER — TELEPHONE (OUTPATIENT)
Dept: FAMILY MEDICINE | Facility: CLINIC | Age: 82
End: 2017-10-23

## 2017-10-23 DIAGNOSIS — R35.0 URINARY FREQUENCY: Primary | ICD-10-CM

## 2017-10-23 LAB
ANTIMICROBIAL SUSCEPTIBILITY: ABNORMAL
Lab: ABNORMAL
URINE CULTURE: ABNORMAL

## 2017-10-23 RX ORDER — SULFAMETHOXAZOLE/TRIMETHOPRIM 800-160 MG
1 TABLET ORAL 2 TIMES DAILY
Qty: 6 TABLET | Refills: 0 | Status: SHIPPED | OUTPATIENT
Start: 2017-10-23 | End: 2017-10-26

## 2017-10-23 NOTE — TELEPHONE ENCOUNTER
Called patient with urine culture results.  Patient says she does have symptoms of urinary frequency and would like to start the medication.  Sent over rx for Septra DS for 3 days to [facility] pharmacy.

## 2017-10-24 ENCOUNTER — TRANSFERRED RECORDS (OUTPATIENT)
Dept: FAMILY MEDICINE | Facility: CLINIC | Age: 82
End: 2017-10-24

## 2017-10-24 ENCOUNTER — OFFICE VISIT (OUTPATIENT)
Dept: FAMILY MEDICINE | Facility: CLINIC | Age: 82
End: 2017-10-24

## 2017-10-24 VITALS
WEIGHT: 152.8 LBS | DIASTOLIC BLOOD PRESSURE: 82 MMHG | BODY MASS INDEX: 27.5 KG/M2 | HEART RATE: 74 BPM | SYSTOLIC BLOOD PRESSURE: 118 MMHG

## 2017-10-24 DIAGNOSIS — F03.90 DEMENTIA WITHOUT BEHAVIORAL DISTURBANCE, UNSPECIFIED DEMENTIA TYPE: Primary | ICD-10-CM

## 2017-10-24 DIAGNOSIS — R55 BLACK-OUT (NOT AMNESIA): ICD-10-CM

## 2017-10-24 PROCEDURE — 99214 OFFICE O/P EST MOD 30 MIN: CPT | Performed by: FAMILY MEDICINE

## 2017-10-24 RX ORDER — AMOXICILLIN 500 MG/1
2000 CAPSULE ORAL PRN
Refills: 2 | COMMUNITY
Start: 2017-10-02

## 2017-10-24 RX ORDER — MEMANTINE HYDROCHLORIDE 5 MG/1
5 TABLET ORAL DAILY
COMMUNITY
End: 2017-10-24

## 2017-10-24 RX ORDER — MEMANTINE HYDROCHLORIDE 5 MG/1
5 TABLET ORAL DAILY
Qty: 90 TABLET | Refills: 3 | Status: SHIPPED | OUTPATIENT
Start: 2017-10-24 | End: 2018-06-07
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
## 2017-11-21 DIAGNOSIS — D51.9 ANEMIA DUE TO VITAMIN B12 DEFICIENCY, UNSPECIFIED B12 DEFICIENCY TYPE: ICD-10-CM

## 2017-11-21 PROCEDURE — 96372 THER/PROPH/DIAG INJ SC/IM: CPT | Performed by: FAMILY MEDICINE

## 2017-11-21 NOTE — NURSING NOTE
The following medication was given:     MEDICATION: Vitamin B12  1000mcg  ROUTE: IM  SITE: Deltoid - Right  DOSE: 1 mL  LOT #: [id number]  :  [employer]  EXPIRATION DATE:  03/2018  NDC#: 9675-3822-39   Shahla Neri MA

## 2018-01-02 DIAGNOSIS — Z76.0 ENCOUNTER FOR MEDICATION REFILL: ICD-10-CM

## 2018-01-02 RX ORDER — CLOPIDOGREL BISULFATE 75 MG/1
75 TABLET ORAL DAILY
Qty: 90 TABLET | Refills: 0 | Status: SHIPPED | OUTPATIENT
Start: 2018-01-02 | End: 2018-04-03

## 2018-03-05 DIAGNOSIS — Z76.0 ENCOUNTER FOR MEDICATION REFILL: ICD-10-CM

## 2018-03-05 RX ORDER — LEVOTHYROXINE SODIUM 50 UG/1
50 TABLET ORAL DAILY
Qty: 90 TABLET | Refills: 1 | Status: SHIPPED | OUTPATIENT
Start: 2018-03-05 | End: 2018-06-01

## 2018-04-03 DIAGNOSIS — Z76.0 ENCOUNTER FOR MEDICATION REFILL: ICD-10-CM

## 2018-04-03 DIAGNOSIS — I10 BENIGN ESSENTIAL HYPERTENSION: ICD-10-CM

## 2018-04-03 NOTE — TELEPHONE ENCOUNTER
multiple rx---last visit was on 9/8/17 (not related), has had other visits (not related) and then had cpx on 10/24/17

## 2018-04-04 RX ORDER — METOPROLOL SUCCINATE 50 MG/1
50 TABLET, EXTENDED RELEASE ORAL DAILY
Qty: 90 TABLET | Refills: 2 | Status: SHIPPED | OUTPATIENT
Start: 2018-04-04 | End: 2018-10-01

## 2018-04-04 RX ORDER — CLOPIDOGREL BISULFATE 75 MG/1
75 TABLET ORAL DAILY
Qty: 90 TABLET | Refills: 2 | Status: SHIPPED | OUTPATIENT
Start: 2018-04-04 | End: 2018-06-01

## 2018-06-01 ENCOUNTER — OFFICE VISIT (OUTPATIENT)
Dept: FAMILY MEDICINE | Facility: CLINIC | Age: 83
End: 2018-06-01

## 2018-06-01 VITALS
DIASTOLIC BLOOD PRESSURE: 68 MMHG | HEIGHT: 63 IN | WEIGHT: 148.6 LBS | HEART RATE: 56 BPM | BODY MASS INDEX: 26.33 KG/M2 | RESPIRATION RATE: 12 BRPM | SYSTOLIC BLOOD PRESSURE: 102 MMHG

## 2018-06-01 DIAGNOSIS — E53.8 VITAMIN B12 DEFICIENCY (NON ANEMIC): ICD-10-CM

## 2018-06-01 DIAGNOSIS — Z76.0 ENCOUNTER FOR MEDICATION REFILL: ICD-10-CM

## 2018-06-01 DIAGNOSIS — H61.23 IMPACTED CERUMEN OF BOTH EARS: ICD-10-CM

## 2018-06-01 DIAGNOSIS — M25.562 CHRONIC PAIN OF LEFT KNEE: Primary | ICD-10-CM

## 2018-06-01 DIAGNOSIS — Z95.2 AORTIC VALVE PROSTHESIS PRESENT: ICD-10-CM

## 2018-06-01 DIAGNOSIS — G89.29 CHRONIC PAIN OF LEFT KNEE: Primary | ICD-10-CM
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
## 2018-06-01 DIAGNOSIS — Z79.01 CHRONIC ANTICOAGULATION: ICD-10-CM

## 2018-06-01 LAB
% GRANULOCYTES: 72.2 % (ref 42.2–75.2)
HCT VFR BLD AUTO: 37.5 % (ref 35–46)
HEMOGLOBIN: 12.7 G/DL (ref 11.8–15.5)
LYMPHOCYTES NFR BLD AUTO: 21.2 % (ref 20.5–51.1)
MCH RBC QN AUTO: 30.7 PG (ref 27–31)
MCHC RBC AUTO-ENTMCNC: 34 G/DL (ref 33–37)
MCV RBC AUTO: 90.5 FL (ref 80–100)
MONOCYTES NFR BLD AUTO: 6.6 % (ref 1.7–9.3)
PLATELET # BLD AUTO: 116 K/UL (ref 140–450)
RBC # BLD AUTO: 4.14 X10/CMM (ref 3.7–5.2)
WBC # BLD AUTO: 5.1 X10/CMM (ref 3.8–11)

## 2018-06-01 PROCEDURE — 96372 THER/PROPH/DIAG INJ SC/IM: CPT | Performed by: FAMILY MEDICINE

## 2018-06-01 PROCEDURE — 85025 COMPLETE CBC W/AUTO DIFF WBC: CPT | Performed by: FAMILY MEDICINE

## 2018-06-01 PROCEDURE — 82607 VITAMIN B-12: CPT | Mod: 90 | Performed by: FAMILY MEDICINE

## 2018-06-01 PROCEDURE — 36415 COLL VENOUS BLD VENIPUNCTURE: CPT | Performed by: FAMILY MEDICINE

## 2018-06-01 PROCEDURE — 73560 X-RAY EXAM OF KNEE 1 OR 2: CPT | Performed by: FAMILY MEDICINE

## 2018-06-01 PROCEDURE — 99214 OFFICE O/P EST MOD 30 MIN: CPT | Mod: 25 | Performed by: FAMILY MEDICINE

## 2018-06-01 RX ORDER — LEVOTHYROXINE SODIUM 50 UG/1
50 TABLET ORAL DAILY
Qty: 90 TABLET | Refills: 1 | Status: SHIPPED | OUTPATIENT
Start: 2018-06-01 | End: 2018-10-01

## 2018-06-01 RX ORDER — CYANOCOBALAMIN 1000 UG/ML
1 INJECTION, SOLUTION INTRAMUSCULAR; SUBCUTANEOUS
Qty: 1 ML | Refills: 3 | OUTPATIENT
Start: 2018-06-01 | End: 2019-01-01

## 2018-06-01 RX ORDER — CLOPIDOGREL BISULFATE 75 MG/1
75 TABLET ORAL DAILY
Qty: 90 TABLET | Refills: 1 | Status: SHIPPED | OUTPATIENT
Start: 2018-06-01 | End: 2018-10-01

## 2018-06-01 NOTE — PATIENT INSTRUCTIONS
Please try taking 1000 mg of Tylenol alternating with 400 mg of ibuprofen (with food in your stomach and while well hydrated) for 1 week.     Also, warm wet towel to left knee cap for 10 minutes several time a day.   Then try some cross fiber friction (we demo'd this today).     If not better, let's have you see a knee specialist.

## 2018-06-01 NOTE — PROGRESS NOTES
Problem(s) Oriented visit      SUBJECTIVE:                                                    Abbi Crowe is a 82 year old female who presents to clinic today for:  Patient presents with:  Plugged Ears: prior to audiology  Knee Pain: left knee - fell in Feb 2018  Fall: 2/2018 DOI- tripped over raised plateform  Fatigue: has been off B12 shot since NOv 2017    Left knee has persistent pain located directly over patella. Difficult for her to knee on the ground to play w/ grandkids. Knee does not feel swollen. Not erythematous. Does not catch, click, lock or grind. No crepitus. Has not given out on her. Did not seek medical attn at the time of the fall b/c thought everything would go away on its own. Now, 4 mos later, still having pain. She was on Plavix when she fell. Did not note large hematoma after she fell.     Needs Plavix refilled. Takes this for aortic valve. Tolerating OK.     States she rec'd Vit B12 shots monthly due to long hx of anemia. She has been caring for her ill  last fall/winter. He has since passed away. She is now motivated to get back on track. No recent Vit B12 level on file. She is not vegetarian. Tries to eat healthy.     Feels as though her ears are plugged w/ cerumen. Requests eval and lavage prn.     Problem list, Medication list, Allergies, and Medical/Social/Surgical histories reviewed in [facility] and updated as appropriate.       ROS:  5 point ROS completed and negative except noted above, including Gen, CV, Resp, GI, MS    Histories:   Patient Active Problem List   Diagnosis     Allergic rhinitis     Essential hypertension     Mixed hyperlipidemia     B12 deficiency anemia     Preventative health care     Health Care Home     Advanced directives, counseling/discussion     HTN (hypertension)     Dyslipidemia     Hypothyroidism     Aortic valve prosthesis present     Thrombocytopenia (H)     Past Medical History:   Diagnosis Date     Allergic rhinitis, cause unspecified      "Allergic rhinitis     Aortic valve prosthesis present 2007    ANW     CAD (coronary artery disease)      Hyperlipidaemia LDL goal < 100      Insomnia      Mixed hyperlipidemia     Hyperlipidemia     Postmenopause     in early 51     Sciatica      Thrombocytopenia (H) 6/3/2018     Unspecified essential hypertension     Essential hypertension     Unspecified hypothyroidism     Hypothyroidism     Past Surgical History:   Procedure Laterality Date     AORTIC VALVE REPLACEMENT      1 vessel CABG     D & C       PHACOEMULSIFICATION CLEAR CORNEA WITH STANDARD INTRAOCULAR LENS IMPLANT  6/6/2012    Procedure:PHACOEMULSIFICATION CLEAR CORNEA WITH STANDARD INTRAOCULAR LENS IMPLANT; LEFT PHACOEMULSIFICATION CLEAR CORNEA WITH STANDARD INTRAOCULAR LENS IMPLANT ; Surgeon:LUIS MANCINI; Location[facility]     Social History   Substance Use Topics     Smoking status: Never Smoker     Smokeless tobacco: Never Used     Alcohol use No     Family History   Problem Relation Age of Onset     HEART DISEASE Mother      CHF     HEART DISEASE Father      Endocrine Disease Brother      CANCER Sister      esophageal      HEART DISEASE Brother      OSTEOPOROSIS Sister        OBJECTIVE:                                                    /68  Pulse 56  Resp 12  Ht 1.588 m (5' 2.5\")  Wt 67.4 kg (148 lb 9.6 oz)  BMI 26.75 kg/m2  Body mass index is 26.75 kg/(m^2).   APPEARANCE: = Relaxed and in no distress  Conj/Eyelids = noninjected and lids and lashes are without inflammation  PERRLA/Irises = Pupils Round Reactive to Light and Irisis without inflammation  Ears/Nose = External structures and Nares have usual shape and form  Ear canals and TM's = impacted cerumen seen in B ear canals. TMs occluded. Somewhat hard of hearing.   Lips/Teeth/Gums = No lesions seen, good dentition, and gums seem healthy  Oropharynx = No leukoplakia, No injection to the tissues, Normal Uvula  Neck = No anterior or posterior adenopathy appreciated.  Thyroid = Not " enlarged and no masses felt  Resp effort = Calm regular breathing  Breath Sounds = Good air movement with no rales or rhonchi in any lung fields  Heart Rate, Rythym, & sounds (no Murm)  = Regular rate and rythym  Ext (edema) = No pretibial edema noted or elsewhere  Musculsktl =  Strength and ROM of major joints are within normal limits  Musculsktl: No obvious effusion. Neither patella are mobile. No pain w/ palpation of joint spaces in either knee. Knee does not open up w/ varus or valgus maneuvers. Lachman's negative. Pt notes pain w/ direct palpation over L patella.    SKIN = absent significant rashes or lesions   Recent/Remote Memory = Alert and Oriented x 3  NEURO = CN II-XII are tested and no deficits found  Mood/Affect = Cooperative and interested     ASSESSMENT/PLAN:                                                      Abbi was seen today for plugged ears, knee pain, fall and fatigue.    Diagnoses and all orders for this visit:    Chronic pain of left knee  -     XR Knee Left 1/2 Views  -     Referral to [facility] p- if pain does not resolve, will have her see Ortho. It is possible this is slow to resolve due occult bleeding when she fell.     Fall, initial encounter  -     XR Knee Left 1/2 Views  -     Referral to [facility]    Vitamin B12 deficiency (non anemic)  -     CBC with Diff/Plt ([facility])  -     Vitamin B12 ([facility])  -     cyanocobalamin (VITAMIN B12) 1000 MCG/ML injection; Inject 1 mL (1,000 mcg) into the muscle every 30 days  -     INJECTION INTRAMUSCULAR OR SUB-Q    Aortic valve prosthesis present    Impacted cerumen of both ears   Successfully lavaged by nursing w/ excellent return of impacted cerumen    Chronic anticoagulation   Tolerating Plavix. Refilled.     Encounter for medication refill  -     clopidogrel (PLAVIX) 75 MG tablet; Take 1 tablet (75 mg) by mouth daily  -     levothyroxine (SYNTHROID/LEVOTHROID) 50 MCG tablet; Take 1 tablet (50 mcg) by  mouth daily    I independently reviewed her knee xray - no patellar fx noted. No large knee effusion noted.   Recommended     AVS Instructions  Please try taking 1000 mg of Tylenol alternating with 400 mg of ibuprofen (with food in your stomach and while well hydrated) for 1 week.     Also, warm wet towel to left knee cap for 10 minutes several time a day.   Then try some cross fiber friction (we demo'd this today).     If not better, let's have you see a knee specialist.     The following health maintenance items are reviewed in Epic and correct as of today:  Health Maintenance   Topic Date Due     DEXA Q2 YR  08/22/2014     TSH Q1 YEAR  09/01/2018     BMP Q1 YR  09/01/2018     LIPID MONITORING Q1 YEAR  09/01/2018     FALL RISK ASSESSMENT  10/20/2018     ADVANCE DIRECTIVE PLANNING Q5 YRS  08/28/2020     TETANUS IMMUNIZATION (SYSTEM ASSIGNED)  08/28/2025     PNEUMOCOCCAL  Completed     INFLUENZA VACCINE  Completed       Mary Zabala MD  Family Medicine    For any issues, please call my office  [facility] at [phone].

## 2018-06-01 NOTE — LETTER
"[facility]  [address] [place]  [address]  Phone: [phone]    June 4, 2018      Abbi Crowe  [address]   [place] [address]              Dear Abbi,    Your platelet count is a little low. These are the cells that help your blood clot. I know you are on Plavix now so you may not note the effects of this mildly low platelet count.     Your Vitamin B12 level is at a good level despite missing several months of Vitamin B12 shots. At your next visit, it would be reasonable to discuss if you need to continue to have these shots. I hope you are feeling great!        Sincerely,     Mary \"Nissa\" MD Vj    Results for orders placed or performed in visit on 06/01/18   CBC with Diff/Plt ([facility])   Result Value Ref Range    WBC x10/cmm 5.1 3.8 - 11.0 x10/cmm    % Lymphocytes 21.2 20.5 - 51.1 %    % Monocytes 6.6 1.7 - 9.3 %    % Granulocytes 72.2 42.2 - 75.2 %    RBC x10/cmm 4.14 3.7 - 5.2 x10/cmm    Hemoglobin 12.7 11.8 - 15.5 g/dl    Hematocrit 37.5 35 - 46 %    MCV 90.5 80 - 100 fL    MCH 30.7 27.0 - 31.0 pg    MCHC 34.0 33.0 - 37.0 g/dL    Platelet Count 116 (A) 140 - 450 K/uL   Vitamin B12 ([facility])   Result Value Ref Range    Vitamin B12 505 232 - 1245 pg/mL    Narrative    Performed at:  01 - [facility] [address], [place], [place]  [id number]  : Memo Nari MD, Phone:  [phone]      "

## 2018-06-01 NOTE — MR AVS SNAPSHOT
After Visit Summary   6/1/2018    Abbi Crowe    MRN: [id number]           Patient Information     Date Of Birth          1935        Visit Information        Provider Department      6/1/2018 1:30 PM Mary Zabala MD [facility]        Today's Diagnoses     Chronic pain of left knee    -  1    Encounter for medication refill        Fall, initial encounter        Vitamin B12 deficiency (non anemic)        Impacted cerumen of both ears          Care Instructions    Please try taking 1000 mg of Tylenol alternating with 400 mg of ibuprofen (with food in your stomach and while well hydrated) for 1 week.     Also, warm wet towel to left knee cap for 10 minutes several time a day.   Then try some cross fiber friction (we demo'd this today).     If not better, let's have you see a knee specialist.           Follow-ups after your visit        Additional Services     Referral to [facility]       Referral to [facility]  Phone:  [phone]  Reason for Referral:  Persistent pain on superficial patella after Feb 1 fall. No edema. On Plavix.     Please be aware that coverage of these services is subject to the terms and limitations of your health insurance plan.  Call member services at your health plan with any benefit or coverage questions.                  Who to contact     If you have questions or need follow up information about today's clinic visit or your schedule please contact [facility] directly at [phone].  Normal or non-critical lab and imaging results will be communicated to you by MyChart, letter or phone within 4 business days after the clinic has received the results. If you do not hear from us within 7 days, please contact the clinic through [employer] or phone. If you have a critical or abnormal lab result, we will notify you by phone as soon as possible.  Submit refill requests through [employer] or call your  "pharmacy and they will forward the refill request to us. Please allow 3 business days for your refill to be completed.          Additional Information About Your Visit        Care EveryWhere ID     This is your Care EveryWhere ID. This could be used by other organizations to access your [place] medical records  [id number]        Your Vitals Were     Pulse Respirations Height BMI (Body Mass Index)          56 12 1.588 m (5' 2.5\") 26.75 kg/m2         Blood Pressure from Last 3 Encounters:   06/01/18 102/68   10/24/17 118/82   10/20/17 130/80    Weight from Last 3 Encounters:   06/01/18 67.4 kg (148 lb 9.6 oz)   10/24/17 69.3 kg (152 lb 12.8 oz)   10/20/17 69.4 kg (153 lb)              We Performed the Following     CBC with Diff/Plt ([facility])     INJECTION INTRAMUSCULAR OR SUB-Q     Referral to [facility]     Vitamin B12 ([facility])     XR Knee Left 1/2 Views          Today's Medication Changes          These changes are accurate as of 6/1/18  2:47 PM.  If you have any questions, ask your nurse or doctor.               These medicines have changed or have updated prescriptions.        Dose/Directions    * cyanocobalamin 1000 MCG/ML injection   Commonly known as:  VITAMIN B12   This may have changed:  Another medication with the same name was added. Make sure you understand how and when to take each.   Used for:  Vitamin B12 deficiency (non anemic)   Changed by:  Mary Zabala MD        Dose:  1 mL   Inject 1 mL (1,000 mcg) into the muscle every 30 days   Quantity:  3 mL   Refills:  2       * cyanocobalamin 1000 MCG/ML injection   Commonly known as:  VITAMIN B12   This may have changed:  You were already taking a medication with the same name, and this prescription was added. Make sure you understand how and when to take each.   Used for:  Vitamin B12 deficiency (non anemic)   Changed by:  Mary Zabala MD        Dose:  1 mL   Inject 1 mL (1,000 mcg) into the muscle every 30 days "   Quantity:  1 mL   Refills:  3       * Notice:  This list has 2 medication(s) that are the same as other medications prescribed for you. Read the directions carefully, and ask your doctor or other care provider to review them with you.         Where to get your medicines      These medications were sent to [id number] - [place], [place] - [address]  [address] [address]     Phone:  [phone]     clopidogrel 75 MG tablet    levothyroxine 50 MCG tablet         Some of these will need a paper prescription and others can be bought over the counter.  Ask your nurse if you have questions.     You don't need a prescription for these medications     cyanocobalamin 1000 MCG/ML injection                Primary Care Provider Office Phone # Fax #    Darlynveronica Ching [phone] [address] [place] [address]        Equal Access to Services     CHASE [facility] WesleyFRANKLIN : Hadii aad ku hadasho Soomaali, waaxda luqadaha, qaybta kaalmada adeegyada, waxay mervinin hayaan myriam serrano . So [facility] [phone].    ATENCIÓN: Si habla español, tiene a shaikh disposición servicios gratuitos de asistencia lingüística. Rashaad al [phone].    We comply with applicable federal civil rights laws and [place] laws. We do not discriminate on the basis of race, color, national origin, age, disability, sex, sexual orientation, or gender identity.            Thank you!     Thank you for choosing [facility]  for your care. Our goal is always to provide you with excellent care. Hearing back from our patients is one way we can continue to improve our services. Please take a few minutes to complete the written survey that you may receive in the mail after your visit with us. Thank you!             Your Updated Medication List - Protect others around you: Learn how to safely use, store and throw away your medicines at www.disposemymeds.org.          This list  is accurate as of 6/1/18  2:47 PM.  Always use your most recent med list.                   Brand Name Dispense Instructions for use Diagnosis    acidophilus Caps      Take 1 capsule by mouth daily    Slow transit constipation       amoxicillin 500 MG capsule    AMOXIL     Take 500 mg by mouth as needed Take 4 capsules by mouth 1 hour prior to dental appointments        aspirin 81 MG tablet      Take 1 tablet by mouth daily.        atorvastatin 20 MG tablet    LIPITOR     Take 20 mg by mouth daily        CALTRATE 600+D 600-400 MG-UNIT per tablet   Generic drug:  calcium-vitamin D      Take 1 tablet by mouth 2 times daily.        clopidogrel 75 MG tablet    PLAVIX    90 tablet    Take 1 tablet (75 mg) by mouth daily    Encounter for medication refill       * cyanocobalamin 1000 MCG/ML injection    VITAMIN B12    3 mL    Inject 1 mL (1,000 mcg) into the muscle every 30 days    Vitamin B12 deficiency (non anemic)       * cyanocobalamin 1000 MCG/ML injection    VITAMIN B12    1 mL    Inject 1 mL (1,000 mcg) into the muscle every 30 days    Vitamin B12 deficiency (non anemic)       levothyroxine 50 MCG tablet    SYNTHROID/LEVOTHROID    90 tablet    Take 1 tablet (50 mcg) by mouth daily    Encounter for medication refill       memantine 5 MG tablet    NAMENDA    90 tablet    Take 1 tablet (5 mg) by mouth daily    Dementia without behavioral disturbance, unspecified dementia type       metoprolol succinate 50 MG 24 hr tablet    TOPROL-XL    90 tablet    Take 1 tablet (50 mg) by mouth daily    Benign essential hypertension       multivitamin Tabs tablet      Take 1 tablet by mouth daily.        VITAMIN D3 PO      Take 2,000 Units by mouth daily        zolpidem 10 MG tablet    AMBIEN     Take 5 mg by mouth nightly as needed for sleep        * Notice:  This list has 2 medication(s) that are the same as other medications prescribed for you. Read the directions carefully, and ask your doctor or other care provider to review  them with you.

## 2018-06-01 NOTE — NURSING NOTE
MEDICATION: Vitamin B12  1000mcg  ROUTE: IM  SITE: Deltoid - Right  DOSE: 1ml  LOT #: [id number]  :  [employer]  EXPIRATION DATE:  11/2018  NDC#: 1342-3352-19

## 2018-06-02 LAB — VIT B12 SERPL-MCNC: 505 PG/ML (ref 232–1245)

## 2018-06-03 PROBLEM — D69.6 THROMBOCYTOPENIA (H): Status: ACTIVE | Noted: 2018-06-03

## 2018-06-07 ENCOUNTER — TELEPHONE (OUTPATIENT)
Dept: FAMILY MEDICINE | Facility: CLINIC | Age: 83
End: 2018-06-07

## 2018-06-07 NOTE — TELEPHONE ENCOUNTER
Patient called to say that when she was last in the office, she asked that we take the namenda off her medication list.  That was not done and is requesting we do that now.    Dimitri Penn,   [facility]  [phone]

## 2018-06-19 ENCOUNTER — DOCUMENTATION ONLY (OUTPATIENT)
Dept: OTHER | Facility: CLINIC | Age: 83
End: 2018-06-19

## 2018-06-19 DIAGNOSIS — Z71.89 ADVANCED DIRECTIVES, COUNSELING/DISCUSSION: Chronic | ICD-10-CM

## 2018-09-25 ENCOUNTER — HOSPITAL ENCOUNTER (OUTPATIENT)
Dept: MAMMOGRAPHY | Facility: CLINIC | Age: 83
Discharge: HOME | End: 2018-09-25
Attending: FAMILY MEDICINE | Admitting: FAMILY MEDICINE
Payer: MEDICARE

## 2018-09-25 DIAGNOSIS — E78.5 DYSLIPIDEMIA: ICD-10-CM

## 2018-09-25 DIAGNOSIS — E78.2 MIXED HYPERLIPIDEMIA: ICD-10-CM

## 2018-09-25 DIAGNOSIS — E03.9 HYPOTHYROIDISM: ICD-10-CM

## 2018-09-25 DIAGNOSIS — I10 HTN (HYPERTENSION): ICD-10-CM

## 2018-09-25 DIAGNOSIS — I10 ESSENTIAL HYPERTENSION: Primary | ICD-10-CM

## 2018-09-25 DIAGNOSIS — Z12.31 VISIT FOR SCREENING MAMMOGRAM: ICD-10-CM

## 2018-09-25 PROCEDURE — 84443 ASSAY THYROID STIM HORMONE: CPT | Mod: 90 | Performed by: FAMILY MEDICINE

## 2018-09-25 PROCEDURE — 77063 BREAST TOMOSYNTHESIS BI: CPT

## 2018-09-25 PROCEDURE — 36415 COLL VENOUS BLD VENIPUNCTURE: CPT | Performed by: FAMILY MEDICINE

## 2018-09-25 PROCEDURE — 80061 LIPID PANEL: CPT | Mod: 90 | Performed by: FAMILY MEDICINE

## 2018-09-25 PROCEDURE — 80053 COMPREHEN METABOLIC PANEL: CPT | Mod: 90 | Performed by: FAMILY MEDICINE

## 2018-09-26 LAB
ALBUMIN SERPL-MCNC: 4.5 G/DL (ref 3.5–4.7)
ALBUMIN/GLOB SERPL: 2.1 {RATIO} (ref 1.2–2.2)
ALP SERPL-CCNC: 56 IU/L (ref 39–117)
ALT SERPL-CCNC: 13 IU/L (ref 0–32)
AST SERPL-CCNC: 14 IU/L (ref 0–40)
BILIRUB SERPL-MCNC: 0.6 MG/DL (ref 0–1.2)
BUN SERPL-MCNC: 18 MG/DL (ref 8–27)
BUN/CREATININE RATIO: 19 (ref 12–28)
CALCIUM SERPL-MCNC: 10 MG/DL (ref 8.7–10.3)
CHLORIDE SERPLBLD-SCNC: 103 MMOL/L (ref 96–106)
CHOLEST SERPL-MCNC: 156 MG/DL (ref 100–199)
CREAT SERPL-MCNC: 0.96 MG/DL (ref 0.57–1)
EGFR IF AFRICN AM: 64 ML/MIN/1.73
EGFR IF NONAFRICN AM: 55 ML/MIN/1.73
GLOBULIN, TOTAL: 2.1 G/DL (ref 1.5–4.5)
GLUCOSE SERPL-MCNC: 93 MG/DL (ref 65–99)
HDLC SERPL-MCNC: 53 MG/DL
LDL/HDL RATIO: 1.5 RATIO (ref 0–3.2)
LDLC SERPL CALC-MCNC: 81 MG/DL (ref 0–99)
POTASSIUM SERPL-SCNC: 4.6 MMOL/L (ref 3.5–5.2)
PROT SERPL-MCNC: 6.6 G/DL (ref 6–8.5)
SODIUM SERPL-SCNC: 142 MMOL/L (ref 134–144)
TOTAL CO2: 28 MMOL/L (ref 20–29)
TRIGL SERPL-MCNC: 108 MG/DL (ref 0–149)
TSH BLD-ACNC: 2.09 UIU/ML (ref 0.45–4.5)
VLDLC SERPL CALC-MCNC: 22 MG/DL (ref 5–40)

## 2018-10-01 ENCOUNTER — OFFICE VISIT (OUTPATIENT)
Dept: FAMILY MEDICINE | Facility: CLINIC | Age: 83
End: 2018-10-01

## 2018-10-01 VITALS
DIASTOLIC BLOOD PRESSURE: 84 MMHG | BODY MASS INDEX: 27.34 KG/M2 | HEART RATE: 72 BPM | HEIGHT: 62 IN | WEIGHT: 148.6 LBS | SYSTOLIC BLOOD PRESSURE: 126 MMHG | RESPIRATION RATE: 16 BRPM

## 2018-10-01 DIAGNOSIS — E03.4 HYPOTHYROIDISM DUE TO ACQUIRED ATROPHY OF THYROID: ICD-10-CM

## 2018-10-01 DIAGNOSIS — F51.01 PRIMARY INSOMNIA: ICD-10-CM

## 2018-10-01 DIAGNOSIS — I25.10 CORONARY ARTERY DISEASE INVOLVING NATIVE CORONARY ARTERY OF NATIVE HEART WITHOUT ANGINA PECTORIS: ICD-10-CM

## 2018-10-01 DIAGNOSIS — Z00.00 MEDICARE ANNUAL WELLNESS VISIT, SUBSEQUENT: Primary | ICD-10-CM

## 2018-10-01 DIAGNOSIS — I10 BENIGN ESSENTIAL HYPERTENSION: ICD-10-CM

## 2018-10-01 PROCEDURE — 99397 PER PM REEVAL EST PAT 65+ YR: CPT | Performed by: FAMILY MEDICINE

## 2018-10-01 PROCEDURE — 99214 OFFICE O/P EST MOD 30 MIN: CPT | Mod: 25 | Performed by: FAMILY MEDICINE

## 2018-10-01 RX ORDER — LEVOTHYROXINE SODIUM 50 UG/1
50 TABLET ORAL DAILY
Qty: 93 TABLET | Refills: 1 | Status: SHIPPED | OUTPATIENT
Start: 2018-10-01 | End: 2019-06-18

## 2018-10-01 RX ORDER — METOPROLOL SUCCINATE 50 MG/1
50 TABLET, EXTENDED RELEASE ORAL DAILY
Qty: 93 TABLET | Refills: 2 | Status: SHIPPED | OUTPATIENT
Start: 2018-10-01 | End: 2019-01-01

## 2018-10-01 RX ORDER — ATORVASTATIN CALCIUM 20 MG/1
20 TABLET, FILM COATED ORAL DAILY
Qty: 93 TABLET | Refills: 2 | Status: SHIPPED | OUTPATIENT
Start: 2018-10-01 | End: 2019-07-19

## 2018-10-01 RX ORDER — ZOLPIDEM TARTRATE 10 MG/1
5 TABLET ORAL
Qty: 45 TABLET | Refills: 3 | Status: SHIPPED | OUTPATIENT
Start: 2018-10-01

## 2018-10-01 RX ORDER — CLOPIDOGREL BISULFATE 75 MG/1
75 TABLET ORAL DAILY
Qty: 93 TABLET | Refills: 1 | Status: SHIPPED | OUTPATIENT
Start: 2018-10-01 | End: 2018-10-02

## 2018-10-01 ASSESSMENT — PATIENT HEALTH QUESTIONNAIRE - PHQ9: 5. POOR APPETITE OR OVEREATING: SEVERAL DAYS

## 2018-10-01 ASSESSMENT — ANXIETY QUESTIONNAIRES
GAD7 TOTAL SCORE: 6
6. BECOMING EASILY ANNOYED OR IRRITABLE: SEVERAL DAYS
7. FEELING AFRAID AS IF SOMETHING AWFUL MIGHT HAPPEN: SEVERAL DAYS
1. FEELING NERVOUS, ANXIOUS, OR ON EDGE: SEVERAL DAYS
IF YOU CHECKED OFF ANY PROBLEMS ON THIS QUESTIONNAIRE, HOW DIFFICULT HAVE THESE PROBLEMS MADE IT FOR YOU TO DO YOUR WORK, TAKE CARE OF THINGS AT HOME, OR GET ALONG WITH OTHER PEOPLE: SOMEWHAT DIFFICULT
3. WORRYING TOO MUCH ABOUT DIFFERENT THINGS: SEVERAL DAYS
5. BEING SO RESTLESS THAT IT IS HARD TO SIT STILL: NOT AT ALL
2. NOT BEING ABLE TO STOP OR CONTROL WORRYING: SEVERAL DAYS

## 2018-10-01 NOTE — PROGRESS NOTES
SUBJECTIVE:   Abbi Crowe is a 83 year old female who presents for Preventive Visit.  ISSUES TO ADDRESS TODAY:  1. Macular degeneration: cannot see well enough to drive safely, would like to have the form for [employer]rom[employer] filled out.  2. Hypothyroidism: takes 50 micrograms.  3. Vitamin B12 injection: stopped this, rechecked levels.  4. CAD/HTN/Hypercholesterolemia:       Are you in the first 12 months of your Medicare Part B coverage?  No    Healthy Habits:    Do you get at least three servings of calcium containing foods daily (dairy, green leafy vegetables, etc.)? yes    Amount of exercise or daily activities, outside of work: 1 day(s) per week    Problems taking medications regularly No    Medication side effects: No    Have you had an eye exam in the past two years? yes    Do you see a dentist twice per year? yes    Do you have sleep apnea, excessive snoring or daytime drowsiness?no      Ability to successfully perform activities of daily living: Yes, no assistance needed    Home safety:  none identified     Hearing impairment: Yes, WEARS HEARING AIDS    Fall risk:  Fallen 2 or more times in the past year?: No  Any fall with injury in the past year?: No    click delete button to remove this line now    COGNITIVE SCREEN  1) Repeat 3 items (Leader, Season, Table)    2) Clock draw: NORMAL  3) 3 item recall: Recalls 3 objects  Results: 3 items recalled: COGNITIVE IMPAIRMENT LESS LIKELY    Mini-CogTM Copyright S Hung. Licensed by the author for use in [facility]; reprinted with permission (deena@.[facility]). All rights reserved.            PROBLEMS TO ADD ON...WILL GO OVER WITH DR. ALTMAN    Reviewed and updated as needed this visit by clinical staff  Tobacco  Allergies  Meds         Reviewed and updated as needed this visit by Provider        Social History   Substance Use Topics     Smoking status: Never Smoker     Smokeless tobacco: Never Used     Alcohol use No       If you drink alcohol  do you typically have >3 drinks per day or >7 drinks per week? No                        Today's PHQ-2 Score:   PHQ-2 Score:     PHQ-2 ( 1999 Pfizer) 10/1/2018 9/7/2016   Q1: Little interest or pleasure in doing things 1 0   Q2: Feeling down, depressed or hopeless 1 0   PHQ-2 Score 2 0       Do you feel safe in your environment - Yes    Do you have a Health Care Directive?: Yes: Advance Directive has been received and scanned.    Current providers sharing in care for this patient include:   Patient Care Team:  Darlyn Ching MD as PCP - General (Family Practice)    The following health maintenance items are reviewed in Epic and correct as of today:  Health Maintenance   Topic Date Due     DEXA Q2 YR  08/22/2014     PHQ-2 Q1 YR  09/07/2017     INFLUENZA VACCINE (1) 09/01/2018     FALL RISK ASSESSMENT  10/20/2018     TSH Q1 YEAR  09/25/2019     BMP Q1 YR  09/25/2019     LIPID MONITORING Q1 YEAR  09/25/2019     ADVANCE DIRECTIVE PLANNING Q5 YRS  06/19/2023     TETANUS IMMUNIZATION (SYSTEM ASSIGNED)  08/28/2025     PNEUMOCOCCAL  Completed     Labs reviewed in EPIC  BP Readings from Last 3 Encounters:   10/01/18 126/84   06/01/18 102/68   10/24/17 118/82    Wt Readings from Last 3 Encounters:   10/01/18 67.4 kg (148 lb 9.6 oz)   06/01/18 67.4 kg (148 lb 9.6 oz)   10/24/17 69.3 kg (152 lb 12.8 oz)                  Patient Active Problem List   Diagnosis     Allergic rhinitis     Essential hypertension     Mixed hyperlipidemia     B12 deficiency anemia     Preventative health care     Health Care Home     Advance Care Planning     HTN (hypertension)     Dyslipidemia     Hypothyroidism     Aortic valve prosthesis present     Thrombocytopenia (H)     Past Surgical History:   Procedure Laterality Date     AORTIC VALVE REPLACEMENT      1 vessel CABG     D & C       PHACOEMULSIFICATION CLEAR CORNEA WITH STANDARD INTRAOCULAR LENS IMPLANT  6/6/2012    Procedure:PHACOEMULSIFICATION CLEAR CORNEA WITH STANDARD INTRAOCULAR  LENS IMPLANT; LEFT PHACOEMULSIFICATION CLEAR CORNEA WITH STANDARD INTRAOCULAR LENS IMPLANT ; Surgeon:LUIS MANCINI; Location:[facility]       Social History   Substance Use Topics     Smoking status: Never Smoker     Smokeless tobacco: Never Used     Alcohol use No     Family History   Problem Relation Age of Onset     HEART DISEASE Mother      CHF     HEART DISEASE Father      Endocrine Disease Brother      Cancer Sister      esophageal      HEART DISEASE Brother      Osteoporosis Sister          Current Outpatient Prescriptions   Medication Sig Dispense Refill     amoxicillin (AMOXIL) 500 MG capsule Take 500 mg by mouth as needed Take 4 capsules by mouth 1 hour prior to dental appointments  2     aspirin 81 MG tablet Take 1 tablet by mouth daily.       atorvastatin (LIPITOR) 20 MG tablet Take 1 tablet (20 mg) by mouth daily 93 tablet 2     calcium-vitamin D (CALTRATE 600+D) 600-400 MG-UNIT per tablet Take 1 tablet by mouth 2 times daily.       Cholecalciferol (VITAMIN D3 PO) Take 2,000 Units by mouth daily       clopidogrel (PLAVIX) 75 MG tablet Take 1 tablet (75 mg) by mouth daily 93 tablet 1     cyanocobalamin (VITAMIN B12) 1000 MCG/ML injection Inject 1 mL (1,000 mcg) into the muscle every 30 days 1 mL 3     Lactobacillus (ACIDOPHILUS) CAPS Take 1 capsule by mouth daily       levothyroxine (SYNTHROID/LEVOTHROID) 50 MCG tablet Take 1 tablet (50 mcg) by mouth daily 93 tablet 1     metoprolol succinate (TOPROL-XL) 50 MG 24 hr tablet Take 1 tablet (50 mg) by mouth daily 93 tablet 2     multivitamin (OCUVITE) TABS Take 1 tablet by mouth daily.       zolpidem (AMBIEN) 10 MG tablet Take 0.5 tablets (5 mg) by mouth nightly as needed for sleep 45 tablet 3     [DISCONTINUED] atorvastatin (LIPITOR) 20 MG tablet Take 20 mg by mouth daily       [DISCONTINUED] clopidogrel (PLAVIX) 75 MG tablet Take 1 tablet (75 mg) by mouth daily 90 tablet 1     [DISCONTINUED] levothyroxine (SYNTHROID/LEVOTHROID) 50 MCG tablet Take 1 tablet  "(50 mcg) by mouth daily 90 tablet 1     [DISCONTINUED] metoprolol succinate (TOPROL-XL) 50 MG 24 hr tablet Take 1 tablet (50 mg) by mouth daily 90 tablet 2     Allergies   Allergen Reactions     Lisinopril Cough     Yellow Dye Itching     Recent Labs   Lab Test  09/25/18   0926  09/01/17   0905  08/31/16   0911   08/21/15   1049  08/21/12   LDL  81  63  75   < >  73   < >  64   HDL  53  56  53   < >  52   < >  52   TRIG  108  84  103   < >  99   < >  104   ALT  13   --   19   --   10   < >   --    CR  0.96  0.94  0.99   < >  1.02*   < >   --    POTASSIUM  4.6  4.8  5.0   < >  5.0   < >   --    TSH   --    --    --    --    --    --   2.20    < > = values in this interval not displayed.            ROS:  Constitutional, HEENT, cardiovascular, pulmonary, GI, , musculoskeletal, neuro, skin, endocrine and psych systems are negative, except as otherwise noted.    OBJECTIVE:   /84  Pulse 72  Resp 16  Ht 1.568 m (5' 1.75\")  Wt 67.4 kg (148 lb 9.6 oz)  BMI 27.4 kg/m2 Estimated body mass index is 27.4 kg/(m^2) as calculated from the following:    Height as of this encounter: 1.568 m (5' 1.75\").    Weight as of this encounter: 67.4 kg (148 lb 9.6 oz).  EXAM:   GENERAL APPEARANCE: healthy, alert and no distress  EYES: Eyes grossly normal to inspection, PERRL and conjunctivae and sclerae normal  HENT: ear canals and TM's normal, nose and mouth without ulcers or lesions, oropharynx clear and oral mucous membranes moist  NECK: no adenopathy, no asymmetry, masses, or scars and thyroid normal to palpation  RESP: lungs clear to auscultation - no rales, rhonchi or wheezes  BREAST: normal without masses, tenderness or nipple discharge and no palpable axillary masses or adenopathy  CV: regular rate and rhythm, normal S1 S2, no S3 or S4, no murmur, click or rub, no peripheral edema and peripheral pulses strong  ABDOMEN: soft, nontender, no hepatosplenomegaly, no masses and bowel sounds normal  MS: no musculoskeletal defects " are noted and gait is age appropriate without ataxia  SKIN: no suspicious lesions or rashes  NEURO: Normal strength and tone, sensory exam grossly normal, mentation intact and speech normal  PSYCH: mentation appears normal and affect normal/bright        ASSESSMENT / PLAN:   Abbi was seen today for medicare visit.    Diagnoses and all orders for this visit:    Medicare annual wellness visit, subsequent  Abbi is a healthy appearing 83 year old female. Imms are UTD. We are not able to offer her influenza vaccine today as our supply is gone until later this week, but she may return for it at any time, BSE reviewed, mammo referral given.    Benign essential hypertension  -     metoprolol succinate (TOPROL-XL) 50 MG 24 hr tablet; Take 1 tablet (50 mg) by mouth daily  Excellent control, continue current medication.    Primary insomnia  -     zolpidem (AMBIEN) 10 MG tablet; Take 0.5 tablets (5 mg) by mouth nightly as needed for sleep  Renewal is given. Use appropriately.     Coronary artery disease involving native coronary artery of native heart without angina pectoris  -     clopidogrel (PLAVIX) 75 MG tablet; Take 1 tablet (75 mg) by mouth daily  -     atorvastatin (LIPITOR) 20 MG tablet; Take 1 tablet (20 mg) by mouth daily  Encouraged low saturated fat diet, low salt diet, regular exercise.    Hypothyroidism due to acquired atrophy of thyroid  -     levothyroxine (SYNTHROID/LEVOTHROID) 50 MCG tablet; Take 1 tablet (50 mcg) by mouth daily          End of Life Planning:  Patient currently has an advanced directive: Yes.  Practitioner is supportive of decision.    COUNSELING:  Reviewed preventive health counseling, as reflected in patient instructions       Regular exercise       Healthy diet/nutrition       Vision screening       Hearing screening       Dental care       Osteoporosis Prevention/Bone Health       Colon cancer screening    BP Readings from Last 1 Encounters:   10/01/18 126/84     Estimated body  "mass index is 27.4 kg/(m^2) as calculated from the following:    Height as of this encounter: 1.568 m (5' 1.75\").    Weight as of this encounter: 67.4 kg (148 lb 9.6 oz).           reports that she has never smoked. She has never used smokeless tobacco.      Appropriate preventive services were discussed with this patient, including applicable screening as appropriate for cardiovascular disease, diabetes, osteopenia/osteoporosis, and glaucoma.  As appropriate for age/gender, discussed screening for colorectal cancer, prostate cancer, breast cancer, and cervical cancer. Checklist reviewing preventive services available has been given to the patient.    Reviewed patients plan of care and provided an AVS. The Basic Care Plan (routine screening as documented in Health Maintenance) for Abbi meets the Care Plan requirement. This Care Plan has been established and reviewed with the Patient.    Counseling Resources:  ATP IV Guidelines  Pooled Cohorts Equation Calculator  Breast Cancer Risk Calculator  FRAX Risk Assessment  ICSI Preventive Guidelines  Dietary Guidelines for Americans, 2010  USDA's MyPlate  ASA Prophylaxis  Lung CA Screening    Darlyn Ching MD  [facility]  "

## 2018-10-01 NOTE — MR AVS SNAPSHOT
After Visit Summary   10/1/2018    Abbi Crowe    MRN: [id number]           Patient Information     Date Of Birth          1935        Visit Information        Provider Department      10/1/2018 3:00 PM Darlyn Ching MD [facility]        Today's Diagnoses     Medicare annual wellness visit, subsequent    -  1    Benign essential hypertension        Primary insomnia        Coronary artery disease involving native coronary artery of native heart without angina pectoris        Hypothyroidism due to acquired atrophy of thyroid          Care Instructions      Preventive Health Recommendations    Female Ages 65 +    Yearly exam:     See your health care provider every year in order to  o Review health changes.   o Discuss preventive care.    o Review your medicines if your doctor has prescribed any.      You no longer need a yearly Pap test unless you've had an abnormal Pap test in the past 10 years. If you have vaginal symptoms, such as bleeding or discharge, be sure to talk with your provider about a Pap test.      Every 1 to 2 years, have a mammogram.  If you are over 69, talk with your health care provider about whether or not you want to continue having screening mammograms.      Every 10 years, have a colonoscopy. Or, have a yearly FIT test (stool test). These exams will check for colon cancer.       Have a cholesterol test every 5 years, or more often if your doctor advises it.       Have a diabetes test (fasting glucose) every three years. If you are at risk for diabetes, you should have this test more often.       At age 65, have a bone density scan (DEXA) to check for osteoporosis (brittle bone disease).    Shots:    Get a flu shot each year.    Get a tetanus shot every 10 years.    Talk to your doctor about your pneumonia vaccines. There are now two you should receive - Pneumovax (PPSV 23) and Prevnar (PCV 13).    Talk to your pharmacist about the shingles  "vaccine.    Talk to your doctor about the hepatitis B vaccine.    Nutrition:     Eat at least 5 servings of fruits and vegetables each day.      Eat whole-grain bread, whole-wheat pasta and brown rice instead of white grains and rice.      Get adequate Calcium and Vitamin D.     Lifestyle    Exercise at least 150 minutes a week (30 minutes a day, 5 days a week). This will help you control your weight and prevent disease.      Limit alcohol to one drink per day.      No smoking.       Wear sunscreen to prevent skin cancer.       See your dentist twice a year for an exam and cleaning.      See your eye doctor every 1 to 2 years to screen for conditions such as glaucoma, macular degeneration and cataracts.          Follow-ups after your visit        Who to contact     If you have questions or need follow up information about today's clinic visit or your schedule please contact [facility] directly at [phone].  Normal or non-critical lab and imaging results will be communicated to you by Andelhart, letter or phone within 4 business days after the clinic has received the results. If you do not hear from us within 7 days, please contact the clinic through [employer] or phone. If you have a critical or abnormal lab result, we will notify you by phone as soon as possible.  Submit refill requests through [employer] or call your pharmacy and they will forward the refill request to us. Please allow 3 business days for your refill to be completed.          Additional Information About Your Visit        Andelhart Information     [employer] lets you send messages to your doctor, view your test results, renew your prescriptions, schedule appointments and more. To sign up, go to [email or web address] . Click on \"Log in\" on the left side of the screen, which will take you to the Welcome page. Then click on \"Sign up Now\" on the right side of the page.     You will be asked to enter the access code listed below, as well as some " "personal information. Please follow the directions to create your username and password.     Your access code is: [email or web address]  Expires: 2018 11:51 PM     Your access code will  in 90 days. If you need help or a new code, please call your [place] [facility] clinic or [phone].        Care EveryWhere ID     This is your Care EveryWhere ID. This could be used by other organizations to access your [facility] medical records  [id number]        Your Vitals Were     Pulse Respirations Height BMI (Body Mass Index)          72 16 1.568 m (5' 1.75\") 27.4 kg/m2         Blood Pressure from Last 3 Encounters:   10/01/18 126/84   18 102/68   10/24/17 118/82    Weight from Last 3 Encounters:   10/01/18 67.4 kg (148 lb 9.6 oz)   18 67.4 kg (148 lb 9.6 oz)   10/24/17 69.3 kg (152 lb 12.8 oz)              Today, you had the following     No orders found for display         Where to get your medicines      These medications were sent to [id number] - [place], [place] - [address]  [address] [address]     Phone:  [phone]     atorvastatin 20 MG tablet    clopidogrel 75 MG tablet    levothyroxine 50 MCG tablet    metoprolol succinate 50 MG 24 hr tablet         Some of these will need a paper prescription and others can be bought over the counter.  Ask your nurse if you have questions.     Bring a paper prescription for each of these medications     zolpidem 10 MG tablet          Primary Care Provider Office Phone # Fax #    Darlyn Ching [phone] [address] [place] [address]        Equal Access to Services     MIGNON CASTILLO : Marisa Gonzales, jasmine lund, gianna kaalmadagmar dunn. So [facility] [phone].    ATENCIÓN: Si habla español, tiene a shaikh disposición servicios gratuitos de asistencia lingüística. Llame al [phone].    We comply with applicable federal " civil rights laws and [place] laws. We do not discriminate on the basis of race, color, national origin, age, disability, sex, sexual orientation, or gender identity.            Thank you!     Thank you for choosing [facility]  for your care. Our goal is always to provide you with excellent care. Hearing back from our patients is one way we can continue to improve our services. Please take a few minutes to complete the written survey that you may receive in the mail after your visit with us. Thank you!             Your Updated Medication List - Protect others around you: Learn how to safely use, store and throw away your medicines at www.disposemymeds.org.          This list is accurate as of 10/1/18 11:51 PM.  Always use your most recent med list.                   Brand Name Dispense Instructions for use Diagnosis    acidophilus Caps      Take 1 capsule by mouth daily    Slow transit constipation       amoxicillin 500 MG capsule    AMOXIL     Take 500 mg by mouth as needed Take 4 capsules by mouth 1 hour prior to dental appointments        aspirin 81 MG tablet      Take 1 tablet by mouth daily.        atorvastatin 20 MG tablet    LIPITOR    93 tablet    Take 1 tablet (20 mg) by mouth daily    Coronary artery disease involving native coronary artery of native heart without angina pectoris       CALTRATE 600+D 600-400 MG-UNIT per tablet   Generic drug:  calcium carbonate 600 mg-vitamin D 400 units      Take 1 tablet by mouth 2 times daily.        clopidogrel 75 MG tablet    PLAVIX    93 tablet    Take 1 tablet (75 mg) by mouth daily    Coronary artery disease involving native coronary artery of native heart without angina pectoris       cyanocobalamin 1000 MCG/ML injection    VITAMIN B12    1 mL    Inject 1 mL (1,000 mcg) into the muscle every 30 days    Vitamin B12 deficiency (non anemic)       levothyroxine 50 MCG tablet    SYNTHROID/LEVOTHROID    93 tablet    Take 1 tablet (50 mcg) by mouth daily     Hypothyroidism due to acquired atrophy of thyroid       metoprolol succinate 50 MG 24 hr tablet    TOPROL-XL    93 tablet    Take 1 tablet (50 mg) by mouth daily    Benign essential hypertension       multivitamin Tabs tablet      Take 1 tablet by mouth daily.        VITAMIN D3 PO      Take 2,000 Units by mouth daily        zolpidem 10 MG tablet    AMBIEN    45 tablet    Take 0.5 tablets (5 mg) by mouth nightly as needed for sleep    Primary insomnia

## 2018-10-02 ENCOUNTER — TRANSFERRED RECORDS (OUTPATIENT)
Dept: FAMILY MEDICINE | Facility: CLINIC | Age: 83
End: 2018-10-02

## 2018-10-02 RX ORDER — CLOPIDOGREL BISULFATE 75 MG/1
75 TABLET ORAL DAILY
Qty: 31 TABLET | Refills: 1 | COMMUNITY
Start: 2018-10-02 | End: 2019-01-01

## 2018-10-02 ASSESSMENT — PATIENT HEALTH QUESTIONNAIRE - PHQ9: SUM OF ALL RESPONSES TO PHQ QUESTIONS 1-9: 8

## 2018-10-02 ASSESSMENT — ANXIETY QUESTIONNAIRES: GAD7 TOTAL SCORE: 6

## 2018-11-21 ENCOUNTER — TRANSFERRED RECORDS (OUTPATIENT)
Dept: FAMILY MEDICINE | Facility: CLINIC | Age: 83
End: 2018-11-21

## 2019-01-01 ENCOUNTER — TELEPHONE (OUTPATIENT)
Dept: FAMILY MEDICINE | Facility: CLINIC | Age: 84
End: 2019-01-01

## 2019-01-01 ENCOUNTER — HOSPITAL ENCOUNTER (OUTPATIENT)
Dept: MAMMOGRAPHY | Facility: CLINIC | Age: 84
Discharge: HOME | End: 2019-09-30
Attending: FAMILY MEDICINE | Admitting: FAMILY MEDICINE
Payer: MEDICARE

## 2019-01-01 ENCOUNTER — OFFICE VISIT (OUTPATIENT)
Dept: FAMILY MEDICINE | Facility: CLINIC | Age: 84
End: 2019-01-01

## 2019-01-01 VITALS
DIASTOLIC BLOOD PRESSURE: 76 MMHG | SYSTOLIC BLOOD PRESSURE: 124 MMHG | HEIGHT: 62 IN | OXYGEN SATURATION: 96 % | HEART RATE: 63 BPM | BODY MASS INDEX: 27.23 KG/M2 | WEIGHT: 148 LBS | RESPIRATION RATE: 16 BRPM

## 2019-01-01 DIAGNOSIS — E03.9 HYPOTHYROIDISM: ICD-10-CM

## 2019-01-01 DIAGNOSIS — E78.5 DYSLIPIDEMIA: ICD-10-CM

## 2019-01-01 DIAGNOSIS — I10 ESSENTIAL HYPERTENSION: ICD-10-CM

## 2019-01-01 DIAGNOSIS — R39.15 URINARY URGENCY: ICD-10-CM

## 2019-01-01 DIAGNOSIS — Z12.31 VISIT FOR SCREENING MAMMOGRAM: ICD-10-CM

## 2019-01-01 DIAGNOSIS — E78.2 MIXED HYPERLIPIDEMIA: Primary | ICD-10-CM

## 2019-01-01 DIAGNOSIS — I25.10 CORONARY ARTERY DISEASE INVOLVING NATIVE CORONARY ARTERY OF NATIVE HEART WITHOUT ANGINA PECTORIS: ICD-10-CM

## 2019-01-01 DIAGNOSIS — I10 BENIGN ESSENTIAL HYPERTENSION: ICD-10-CM

## 2019-01-01 DIAGNOSIS — H61.21 IMPACTED CERUMEN OF RIGHT EAR: ICD-10-CM

## 2019-01-01 DIAGNOSIS — R35.0 URINARY FREQUENCY: ICD-10-CM

## 2019-01-01 DIAGNOSIS — R07.9 CHEST PAIN: Primary | ICD-10-CM

## 2019-01-01 DIAGNOSIS — E03.4 HYPOTHYROIDISM DUE TO ACQUIRED ATROPHY OF THYROID: ICD-10-CM

## 2019-01-01 DIAGNOSIS — Z23 NEED FOR IMMUNIZATION AGAINST INFLUENZA: ICD-10-CM

## 2019-01-01 DIAGNOSIS — Z71.89 DNR (DO NOT RESUSCITATE) DISCUSSION: ICD-10-CM

## 2019-01-01 DIAGNOSIS — R30.0 DYSURIA: ICD-10-CM

## 2019-01-01 DIAGNOSIS — D69.6 THROMBOCYTOPENIA (H): ICD-10-CM

## 2019-01-01 DIAGNOSIS — Z00.00 ENCOUNTER FOR MEDICARE ANNUAL WELLNESS EXAM: Primary | ICD-10-CM

## 2019-01-01 DIAGNOSIS — R35.0 URINARY FREQUENCY: Primary | ICD-10-CM

## 2019-01-01 LAB
% GRANULOCYTES: 68 % (ref 42.2–75.2)
ALBUMIN SERPL-MCNC: 4.6 G/DL (ref 3.5–4.7)
ALBUMIN/GLOB SERPL: 2.7 {RATIO} (ref 1.2–2.2)
ALP SERPL-CCNC: 63 IU/L (ref 39–117)
ALT SERPL-CCNC: 11 IU/L (ref 0–32)
AST SERPL-CCNC: 15 IU/L (ref 0–40)
BACTERIA URINE: ABNORMAL
BILIRUB SERPL-MCNC: 0.6 MG/DL (ref 0–1.2)
BILIRUB UR QL STRIP: 0
BLOOD URINE DIP: ABNORMAL
BUN SERPL-MCNC: 19 MG/DL (ref 8–27)
BUN/CREATININE RATIO: 19 (ref 12–28)
CALCIUM SERPL-MCNC: 10 MG/DL (ref 8.7–10.3)
CASTS/LPF: 0
CHLORIDE SERPLBLD-SCNC: 104 MMOL/L (ref 96–106)
CHOLEST SERPL-MCNC: 136 MG/DL (ref 100–199)
COLOR UR: ABNORMAL
CREAT SERPL-MCNC: 1.01 MG/DL (ref 0.57–1)
CRYSTAL URINE: 0
EGFR IF AFRICN AM: 59 ML/MIN/1.73
EGFR IF NONAFRICN AM: 52 ML/MIN/1.73
EPITHELIAL CELLS - QUEST: ABNORMAL
GLOBULIN, TOTAL: 1.7 G/DL (ref 1.5–4.5)
GLUCOSE SERPL-MCNC: 94 MG/DL (ref 65–99)
GLUCOSE UR STRIP-MCNC: 0 MG/DL
HCT VFR BLD AUTO: 39.8 % (ref 35–46)
HDLC SERPL-MCNC: 52 MG/DL
HEMOGLOBIN: 13.2 G/DL (ref 11.8–15.5)
KETONES UR QL STRIP: ABNORMAL
LDL/HDL RATIO: 1.3 RATIO (ref 0–3.2)
LDLC SERPL CALC-MCNC: 66 MG/DL (ref 0–99)
LEUKOCYTE ESTERASE URINE DIP: ABNORMAL
LYMPHOCYTES NFR BLD AUTO: 25.2 % (ref 20.5–51.1)
Lab: NORMAL
MCH RBC QN AUTO: 30.2 PG (ref 27–31)
MCHC RBC AUTO-ENTMCNC: 33.3 G/DL (ref 33–37)
MCV RBC AUTO: 90.5 FL (ref 80–100)
MONOCYTES NFR BLD AUTO: 6.8 % (ref 1.7–9.3)
MUCOUS URINE: 0
NITRITE UR QL STRIP: ABNORMAL
PH UR STRIP: 5.5 PH (ref 5–9)
PLATELET # BLD AUTO: 129 K/UL (ref 140–450)
POTASSIUM SERPL-SCNC: 5.1 MMOL/L (ref 3.5–5.2)
PROT SERPL-MCNC: 6.3 G/DL (ref 6–8.5)
PROT UR QL: ABNORMAL MG/DL (ref ?–0.01)
RBC # BLD AUTO: 4.39 X10/CMM (ref 3.7–5.2)
RBC URINE: ABNORMAL (ref 0–3)
SODIUM SERPL-SCNC: 142 MMOL/L (ref 134–144)
SP GR UR STRIP: 1.02 (ref 1–1.02)
TOTAL CO2: 24 MMOL/L (ref 20–29)
TRIGL SERPL-MCNC: 90 MG/DL (ref 0–149)
TSH BLD-ACNC: 4.09 UIU/ML (ref 0.45–4.5)
URINE CULTURE: NORMAL
UROBILINOGEN UR QL STRIP: 0.2 EU/DL (ref 0.2–1)
VLDLC SERPL CALC-MCNC: 18 MG/DL (ref 5–40)
WBC # BLD AUTO: 4.4 X10/CMM (ref 3.8–11)
WBC URINE: ABNORMAL (ref 0–3)

## 2019-01-01 PROCEDURE — G0008 ADMIN INFLUENZA VIRUS VAC: HCPCS | Performed by: FAMILY MEDICINE

## 2019-01-01 PROCEDURE — G0439 PPPS, SUBSEQ VISIT: HCPCS | Performed by: FAMILY MEDICINE

## 2019-01-01 PROCEDURE — 69209 REMOVE IMPACTED EAR WAX UNI: CPT | Mod: 50 | Performed by: FAMILY MEDICINE

## 2019-01-01 PROCEDURE — 99213 OFFICE O/P EST LOW 20 MIN: CPT | Mod: 25 | Performed by: FAMILY MEDICINE

## 2019-01-01 PROCEDURE — 90662 IIV NO PRSV INCREASED AG IM: CPT | Performed by: FAMILY MEDICINE

## 2019-01-01 PROCEDURE — 77063 BREAST TOMOSYNTHESIS BI: CPT

## 2019-01-01 PROCEDURE — 85025 COMPLETE CBC W/AUTO DIFF WBC: CPT | Performed by: FAMILY MEDICINE

## 2019-01-01 PROCEDURE — 36415 COLL VENOUS BLD VENIPUNCTURE: CPT | Performed by: FAMILY MEDICINE

## 2019-01-01 PROCEDURE — 81003 URINALYSIS AUTO W/O SCOPE: CPT | Performed by: FAMILY MEDICINE

## 2019-01-01 RX ORDER — METOPROLOL SUCCINATE 25 MG/1
12.5 TABLET, EXTENDED RELEASE ORAL DAILY
COMMUNITY
Start: 2019-01-01 | End: 2020-01-01

## 2019-01-01 RX ORDER — ATORVASTATIN CALCIUM 20 MG/1
20 TABLET, FILM COATED ORAL DAILY
Qty: 93 TABLET | Refills: 2 | Status: SHIPPED | OUTPATIENT
Start: 2019-01-01 | End: 2020-01-01

## 2019-01-01 RX ORDER — NITROGLYCERIN 0.4 MG/1
TABLET SUBLINGUAL
Qty: 25 TABLET | Refills: 3 | Status: SHIPPED | OUTPATIENT
Start: 2019-01-01

## 2019-01-01 RX ORDER — METOPROLOL SUCCINATE 50 MG/1
25 TABLET, EXTENDED RELEASE ORAL DAILY
Qty: 45 TABLET | Refills: 1 | Status: SHIPPED | OUTPATIENT
Start: 2019-01-01 | End: 2020-01-01

## 2019-01-01 RX ORDER — LEVOTHYROXINE SODIUM 50 UG/1
50 TABLET ORAL DAILY
Qty: 90 TABLET | Refills: 0 | Status: SHIPPED | OUTPATIENT
Start: 2019-01-01 | End: 2019-01-01

## 2019-01-01 RX ORDER — LEVOTHYROXINE SODIUM 50 UG/1
50 TABLET ORAL DAILY
Qty: 90 TABLET | Refills: 3 | Status: SHIPPED | OUTPATIENT
Start: 2019-01-01

## 2019-01-01 RX ORDER — METOPROLOL SUCCINATE 50 MG/1
25 TABLET, EXTENDED RELEASE ORAL DAILY
COMMUNITY
Start: 2019-01-01 | End: 2019-01-01

## 2019-01-01 RX ORDER — CLOPIDOGREL BISULFATE 75 MG/1
75 TABLET ORAL DAILY
Qty: 90 TABLET | Refills: 3 | Status: SHIPPED | OUTPATIENT
Start: 2019-01-01

## 2019-01-01 RX ORDER — SULFAMETHOXAZOLE/TRIMETHOPRIM 800-160 MG
1 TABLET ORAL 2 TIMES DAILY
Qty: 14 TABLET | Refills: 0 | Status: SHIPPED | OUTPATIENT
Start: 2019-01-01 | End: 2020-01-01

## 2019-01-01 ASSESSMENT — MIFFLIN-ST. JEOR: SCORE: 1079.57

## 2019-06-18 DIAGNOSIS — E03.4 HYPOTHYROIDISM DUE TO ACQUIRED ATROPHY OF THYROID: ICD-10-CM

## 2019-06-18 RX ORDER — LEVOTHYROXINE SODIUM 50 UG/1
50 TABLET ORAL DAILY
Qty: 90 TABLET | Refills: 0 | Status: SHIPPED | OUTPATIENT
Start: 2019-06-18 | End: 2019-01-01

## 2019-06-18 NOTE — TELEPHONE ENCOUNTER
Levothyroxine  LOV (cpx) 10/1/18  Last labs 6/25/18     TSH   Date Value Ref Range Status   08/21/2012 2.20 0.50 - 6.00 mcU/mL Final

## 2019-07-18 DIAGNOSIS — I25.10 CORONARY ARTERY DISEASE INVOLVING NATIVE CORONARY ARTERY OF NATIVE HEART WITHOUT ANGINA PECTORIS: ICD-10-CM

## 2019-09-13 NOTE — TELEPHONE ENCOUNTER
Levothyroxine  LOV (cpx) 10/1/18  Last Labs 9/25/18    Fasting Labs Scheduled 9/26/19  OV Scheduled 10/3/19    TSH   Date Value Ref Range Status   08/21/2012 2.20 0.50 - 6.00 mcU/mL Final

## 2019-09-26 NOTE — LETTER
[facility]  [address] [place]  [address]  Phone: [phone]    September 27, 2019      Abbi Crowe  [address]   [facility] [address]              Dear Abbi,    The results from your recent visit showed     Cholesterol is excellent.   Thyroid is normal.   Platelets are mildly low, but stable over time and not at a worrisome level.   Normal glucose, electrolytes, kidney function, and liver function.     Sincerely,     Darlyn Ching M.D.    Results for orders placed or performed in visit on 09/26/19   Lipid Panel ([facility])   Result Value Ref Range    Cholesterol 136 100 - 199 mg/dL    Triglycerides 90 0 - 149 mg/dL    HDL Cholesterol 52 >39 mg/dL    VLDL Cholesterol German 18 5 - 40 mg/dL    LDL Cholesterol Calculated 66 0 - 99 mg/dL    LDL/HDL Ratio 1.3 0.0 - 3.2 ratio    Narrative    Performed at:  01 - [facility]  [address] [place], [place]  [id number]  : Navin Alonzo MD, Phone:  [phone]   Comp. Metabolic Panel (14) ([facility])   Result Value Ref Range    Glucose 94 65 - 99 mg/dL    Urea Nitrogen 19 8 - 27 mg/dL    Creatinine 1.01 (H) 0.57 - 1.00 mg/dL    eGFR If NonAfricn Am 52 (L) >59 mL/min/1.73    eGFR If Africn Am 59 (L) >59 mL/min/1.73    BUN/Creatinine Ratio 19 12 - 28    Sodium 142 134 - 144 mmol/L    Potassium 5.1 3.5 - 5.2 mmol/L    Chloride 104 96 - 106 mmol/L    Total CO2 24 20 - 29 mmol/L    Calcium 10.0 8.7 - 10.3 mg/dL    Protein Total 6.3 6.0 - 8.5 g/dL    Albumin 4.6 3.5 - 4.7 g/dL    Globulin, Total 1.7 1.5 - 4.5 g/dL    A/G Ratio 2.7 (H) 1.2 - 2.2    Bilirubin Total 0.6 0.0 - 1.2 mg/dL    Alkaline Phosphatase 63 39 - 117 IU/L    AST 15 0 - 40 IU/L    ALT 11 0 - 32 IU/L    Narrative    Performed at:  01  [facility] [address], [place], [place]  [id number]  : Navin Alonzo MD, Phone:  [phone]   TSH ([facility])   Result Value Ref Range    TSH 4.090 0.450 - 4.500 uIU/mL    Narrative    Performed  at:  01 - [facility]  [address] [place], [place]  [id number]  : Navin Alonzo MD, Phone:  [phone]   CBC with Diff/Plt ([facility])   Result Value Ref Range    WBC x10/cmm 4.4 3.8 - 11.0 x10/cmm    % Lymphocytes 25.2 20.5 - 51.1 %    % Monocytes 6.8 1.7 - 9.3 %    % Granulocytes 68.0 42.2 - 75.2 %    RBC x10/cmm 4.39 3.7 - 5.2 x10/cmm    Hemoglobin 13.2 11.8 - 15.5 g/dl    Hematocrit 39.8 35 - 46 %    MCV 90.5 80 - 100 fL    MCH 30.2 27.0 - 31.0 pg    MCHC 33.3 33.0 - 37.0 g/dL    Platelet Count 129 (A) 140 - 450 K/uL

## 2019-10-01 NOTE — TELEPHONE ENCOUNTER
Component      Latest Ref Rng & Units 10/1/2019   Color Urine       Jen (A)   pH Urine      5 - 9 pH 5.5   Specific Gravity Urine      1.005 - 1.025 1.025   Protein Urine      0.01 mg/dL 2+ (A)   Glucose Urine       0   Ketones Urine       trace (A)   Leukocyte Esterase Urine       2+ (A)   Blood Urine       3+ (A)   Nitrite Urine      NEG Neg   Bilirubin Urine       0   Urobilinogen Urine      0.2 - 1.0 EU/dL 0.2   WBC Urine      0 - 3 loosely packed (A)   RBC Urine      0 - 3 loosely packed (A)   Epithelial Cells       1-3   Crystal Urine       0   Bacteria Urine       moderate (A)   Mucous Urine       0   Casts/LPF       0      Plan: per Dr. Ching - start Bactrim DS BID x 7 days. UC pending.  Patient informed and will get started on antibiotics today. Encouraged good hydration.   Ellen Mejia RN

## 2019-10-01 NOTE — TELEPHONE ENCOUNTER
Patient calls reporting 5 days of escalating urinary urgency, frequency and dysuria. Denies fever/ill/chill/n&v/flank or abd pain/hematuria.   Has appt with Dr. Ching tomorrow for other issue and asks if could RTC this am to leave a urine specimen.   Plan: ok per Dr. Ching to RTC this am for UA (and UC if needed). RN will review results with Dr. Ching and call patient with plan.   Ellen Mejia RN

## 2019-10-01 NOTE — PROGRESS NOTES
urine - frequency at night, throbbing sensation x 1 wk- per patient  Results to Triage Ellen Mejia RN

## 2019-10-03 NOTE — PROGRESS NOTES
"  SUBJECTIVE:   Abbi Crowe is a 84 year old female who presents for Preventive Visit.  ISSUES TO ADDRESS TODAY:  1. Ear wax: using oil to soften so we can flush ears today.  2. HTN: doing well on metoprolol, now taking half of a 25 mg tablet. She does have occasional chest pain or pressure with exertion. She had Nuclear Stress Test less than a year ago that was normal. See results below.   3. Hypothyroidism: doing well on current levothyroxine with normal TSH done last week.  4. Recent UTI: diagnosed two days ago and put on Bactrim, felt better immediately.     Are you in the first 12 months of your Medicare Part B coverage?  No    Physical Health:    In general, how would you rate your overall physical health? good    Outside of work, how many days during the week do you exercise? 1 day/week    Outside of work, approximately how many minutes a day do you exercise?45-60 minutes    If you drink alcohol do you typically have >3 drinks per day or >7 drinks per week? Not Applicable    Do you usually eat at least 4 servings of fruit and vegetables a day, include whole grains & fiber and avoid regularly eating high fat or \"junk\" foods? Yes    Do you have any problems taking medications regularly?  No    Do you have any side effects from medications? none    Needs assistance for the following daily activities: no assistance needed    Which of the following safety concerns are present in your home?  none identified     Hearing impairment: Hearing Aids    In the past 6 months, have you been bothered by leaking of urine? yes    Mental Health:    In general, how would you rate your overall mental or emotional health? good  PHQ-2 Score:      Do you feel safe in your environment? Yes    Do you have a Health Care Directive? Yes: Advance Directive has been received and scanned.    Fall risk:  Fallen 2 or more times in the past year?: No  Any fall with injury in the past year?: No    Cognitive Screenin) Repeat 3 " items (Leader, Season, Table)    2) Clock draw: NORMAL  3) 3 item recall: Recalls 2 objects   Results: NORMAL clock, 1-2 items recalled: COGNITIVE IMPAIRMENT LESS LIKELY    Mini-CogTM Copyright S Hung. Licensed by the author for use in [facility]; reprinted with permission (deena@[facility]). All rights reserved.      Do you have sleep apnea, excessive snoring or daytime drowsiness?: no      Reviewed and updated as needed this visit by clinical staff  Tobacco  Allergies  Meds         Reviewed and updated as needed this visit by Provider        Social History     Tobacco Use     Smoking status: Never Smoker     Smokeless tobacco: Never Used   Substance Use Topics     Alcohol use: No     Alcohol/week: 0.0 standard drinks                           Current providers sharing in care for this patient include:   Patient Care Team:  Darlyn Ching MD as PCP - General (Family Practice)  Darlyn Ching MD as Assigned PCP    The following health maintenance items are reviewed in Epic and correct as of today:  Health Maintenance   Topic Date Due     ZOSTER IMMUNIZATION (2 of 3) 09/18/2007     DEXA  08/22/2014     PHQ-2  01/01/2019     INFLUENZA VACCINE (1) 09/01/2019     FALL RISK ASSESSMENT  10/01/2019     MEDICARE ANNUAL WELLNESS VISIT  10/01/2019     BMP  09/26/2020     LIPID  09/26/2020     TSH W/FREE T4 REFLEX  09/26/2020     ADVANCE CARE PLANNING  06/19/2023     DTAP/TDAP/TD IMMUNIZATION (4 - Td) 08/28/2025     PNEUMOCOCCAL IMMUNIZATION 65+ LOW/MEDIUM RISK  Completed     IPV IMMUNIZATION  Aged Out     MENINGITIS IMMUNIZATION  Aged Out     Lab work is in process  Labs reviewed in EPIC  BP Readings from Last 3 Encounters:   10/03/19 124/76   10/01/18 126/84   06/01/18 102/68    Wt Readings from Last 3 Encounters:   10/03/19 67.1 kg (148 lb)   10/01/18 67.4 kg (148 lb 9.6 oz)   06/01/18 67.4 kg (148 lb 9.6 oz)                  Patient Active Problem List   Diagnosis     Allergic rhinitis      Essential hypertension     Mixed hyperlipidemia     B12 deficiency anemia     Preventative health care     Health Care Home     Advance Care Planning     Dyslipidemia     Hypothyroidism     Aortic valve prosthesis present     Thrombocytopenia (H)     Past Surgical History:   Procedure Laterality Date     AORTIC VALVE REPLACEMENT      1 vessel CABG     D & C       PHACOEMULSIFICATION CLEAR CORNEA WITH STANDARD INTRAOCULAR LENS IMPLANT  6/6/2012    Procedure:PHACOEMULSIFICATION CLEAR CORNEA WITH STANDARD INTRAOCULAR LENS IMPLANT; LEFT PHACOEMULSIFICATION CLEAR CORNEA WITH STANDARD INTRAOCULAR LENS IMPLANT ; Surgeon:LUIS MANCINI; Location[facility]       Social History     Tobacco Use     Smoking status: Never Smoker     Smokeless tobacco: Never Used   Substance Use Topics     Alcohol use: No     Alcohol/week: 0.0 standard drinks     Family History   Problem Relation Age of Onset     Heart Disease Mother         CHF     Heart Disease Father      Endocrine Disease Brother      Cancer Sister         esophageal      Heart Disease Brother      Osteoporosis Sister          Current Outpatient Medications   Medication Sig Dispense Refill     amoxicillin (AMOXIL) 500 MG capsule Take 500 mg by mouth as needed Take 4 capsules by mouth 1 hour prior to dental appointments  2     aspirin 81 MG tablet Take 1 tablet by mouth daily.       atorvastatin (LIPITOR) 20 MG tablet Take 1 tablet (20 mg) by mouth daily 93 tablet 2     calcium-vitamin D (CALTRATE 600+D) 600-400 MG-UNIT per tablet Take 1 tablet by mouth 2 times daily.       Cholecalciferol (VITAMIN D3 PO) Take 2,000 Units by mouth daily       clopidogrel (PLAVIX) 75 MG tablet Take 1 tablet (75 mg) by mouth daily 90 tablet 3     Lactobacillus (ACIDOPHILUS) CAPS Take 1 capsule by mouth daily       [START ON 12/13/2019] levothyroxine (SYNTHROID/LEVOTHROID) 50 MCG tablet Take 1 tablet (50 mcg) by mouth daily 90 tablet 3     metoprolol succinate ER (TOPROL-XL) 25 MG 24 hr tablet Take  "0.5 tablets (12.5 mg) by mouth daily       multivitamin (OCUVITE) TABS Take 1 tablet by mouth daily.       sulfamethoxazole-trimethoprim (BACTRIM DS/SEPTRA DS) 800-160 MG tablet Take 1 tablet by mouth 2 times daily for 7 days 14 tablet 0     zolpidem (AMBIEN) 10 MG tablet Take 0.5 tablets (5 mg) by mouth nightly as needed for sleep 45 tablet 3     Allergies   Allergen Reactions     Lisinopril Cough     Yellow Dye Itching     Recent Labs   Lab Test 09/26/19  1004 09/25/18  0926 09/01/17  0905 08/31/16  0911  08/21/12   LDL 66 81 63 75   < > 64   HDL 52 53 56 53   < > 52   TRIG 90 108 84 103   < > 104   ALT 11 13  --  19   < >  --    CR 1.01* 0.96 0.94 0.99   < >  --    POTASSIUM 5.1 4.6 4.8 5.0   < >  --    TSH  --   --   --   --   --  2.20    < > = values in this interval not displayed.      12/5/18            MYOCARDIAL PERFUSION IMAGING REPORT  REST/STRESS SINGLE ISOTOPE GATED SPECT IMAGING USING GD.    IMPRESSION   1. Adequate pharmacologic stress test with regadenoson and low level exercise.   2. The pharmacologic stress ECG was nondiagnostic due to LBBB.   3. Myocardial perfusion was normal.   4. Left ventricular cavity size was normal (EDV 86 ml).   5. Overall left ventricular systolic function was normal without wall motion abnormalities. The post stress LVEF was calculated to be 76 %.   6. Compared to prior study of 9/25/13, no significant ischemia on this study.    ROS:  Constitutional, HEENT, cardiovascular, pulmonary, GI, , musculoskeletal, neuro, skin, endocrine and psych systems are negative, except as otherwise noted.    OBJECTIVE:   /76   Pulse 63   Resp 16   Ht 1.575 m (5' 2\")   Wt 67.1 kg (148 lb)   SpO2 96%   BMI 27.07 kg/m   Estimated body mass index is 27.07 kg/m  as calculated from the following:    Height as of this encounter: 1.575 m (5' 2\").    Weight as of this encounter: 67.1 kg (148 lb).  EXAM:   GENERAL APPEARANCE: healthy, alert and no distress  EYES: Eyes grossly normal to " inspection, PERRL and conjunctivae and sclerae normal  HENT: nose and mouth without ulcers or lesions, oropharynx clear, oral mucous membranes moist, right ear: occluded with wax, clear after ear was flushed with warm water. Left ear: normal: no effusions, no erythema, normal landmarks  NECK: no adenopathy, no asymmetry, masses, or scars and thyroid normal to palpation  RESP: lungs clear to auscultation - no rales, rhonchi or wheezes  BREAST: normal without masses, tenderness or nipple discharge and no palpable axillary masses or adenopathy  CV: regular rate and rhythm, normal S1 S2, no S3 or S4, no murmur, click or rub, no peripheral edema and peripheral pulses strong  ABDOMEN: soft, nontender, no hepatosplenomegaly, no masses and bowel sounds normal  MS: no musculoskeletal defects are noted and gait is age appropriate without ataxia  SKIN: no suspicious lesions or rashes  NEURO: Normal strength and tone, sensory exam grossly normal, mentation intact and speech normal  PSYCH: mentation appears normal and affect normal/bright        ASSESSMENT / PLAN:   Abbi was seen today for physical, hearing screening and ear problem.    Diagnoses and all orders for this visit:    Encounter for Medicare annual wellness exam  Healthy appearing 84 year old female. Imms are UTD, recommend Shingrix and Influenza vaccine is given. BSE reviewed, continue mammograms.    Benign essential hypertension  Good control on current medications, continue at same doses. Recommend exercise and low salt diet.    Thrombocytopenia (H)  Stable and chronic. Safe at >100,000.    Coronary artery disease involving native coronary artery of native heart without angina pectoris  -     clopidogrel (PLAVIX) 75 MG tablet; Take 1 tablet (75 mg) by mouth daily    Hypothyroidism due to acquired atrophy of thyroid  -     levothyroxine (SYNTHROID/LEVOTHROID) 50 MCG tablet; Take 1 tablet (50 mcg) by mouth daily    DNR (do not resuscitate) discussion  -      "DNR/DNI  Already has POLST on her refrigerator at home.    Impacted cerumen of right ear  -     Removal Impacted Cerumen Irrigation Unilateral (Ear Wash)  Cleared with ear flush.    Other orders  -     FLU VACCINE, INCREASED ANTIGEN, PRESV FREE  -     ADMIN INFLUENZA VIRUS VAC        End of Life Planning:  Patient currently has an advanced directive: No.  I have verified the patient's ablity to prepare an advanced directive/make health care decisions.  Literature was provided to assist patient in preparing an advanced directive.    COUNSELING:  Reviewed preventive health counseling, as reflected in patient instructions       Regular exercise       Healthy diet/nutrition       Vision screening       Hearing screening       Dental care       Bladder control       Fall risk prevention       Osteoporosis Prevention/Bone Health       Colon cancer screening    Estimated body mass index is 27.07 kg/m  as calculated from the following:    Height as of this encounter: 1.575 m (5' 2\").    Weight as of this encounter: 67.1 kg (148 lb).         reports that she has never smoked. She has never used smokeless tobacco.      Appropriate preventive services were discussed with this patient, including applicable screening as appropriate for cardiovascular disease, diabetes, osteopenia/osteoporosis, and glaucoma.  As appropriate for age/gender, discussed screening for colorectal cancer, prostate cancer, breast cancer, and cervical cancer. Checklist reviewing preventive services available has been given to the patient.    Reviewed patients plan of care and provided an AVS. The Basic Care Plan (routine screening as documented in Health Maintenance) for Abbi meets the Care Plan requirement. This Care Plan has been established and reviewed with the Patient.    Counseling Resources:  ATP IV Guidelines  Pooled Cohorts Equation Calculator  Breast Cancer Risk Calculator  FRAX Risk Assessment  ICSI Preventive Guidelines  Dietary " Guidelines for Americans, 2010  USDA's MyPlate  ASA Prophylaxis  Lung CA Screening    Darlyn Ching MD  [facility]

## 2019-12-09 NOTE — TELEPHONE ENCOUNTER
Patient called requesting prescription for Nitrostat. She says she talked to Dr Ching about it at her wellness exam.  Per Dr Ching OK for prescription.  Sent to [facility] pharmacy.

## 2020-01-01 ENCOUNTER — APPOINTMENT (OUTPATIENT)
Dept: CARDIOLOGY | Facility: CLINIC | Age: 85
End: 2020-01-01
Attending: INTERNAL MEDICINE
Payer: MEDICARE

## 2020-01-01 ENCOUNTER — OFFICE VISIT (OUTPATIENT)
Dept: FAMILY MEDICINE | Facility: CLINIC | Age: 85
End: 2020-01-01

## 2020-01-01 ENCOUNTER — APPOINTMENT (OUTPATIENT)
Dept: CT IMAGING | Facility: CLINIC | Age: 85
End: 2020-01-01
Attending: EMERGENCY MEDICINE
Payer: MEDICARE

## 2020-01-01 ENCOUNTER — TELEPHONE (OUTPATIENT)
Dept: CARDIOLOGY | Facility: CLINIC | Age: 85
End: 2020-01-01

## 2020-01-01 ENCOUNTER — HOSPITAL ENCOUNTER (OUTPATIENT)
Facility: CLINIC | Age: 85
Setting detail: OBSERVATION
Discharge: HOME | End: 2020-03-08
Attending: EMERGENCY MEDICINE | Admitting: HOSPITALIST
Payer: MEDICARE

## 2020-01-01 VITALS
TEMPERATURE: 97.2 F | RESPIRATION RATE: 16 BRPM | SYSTOLIC BLOOD PRESSURE: 123 MMHG | HEIGHT: 63 IN | OXYGEN SATURATION: 94 % | DIASTOLIC BLOOD PRESSURE: 41 MMHG | WEIGHT: 150.3 LBS | BODY MASS INDEX: 26.63 KG/M2 | HEART RATE: 63 BPM

## 2020-01-01 VITALS
OXYGEN SATURATION: 96 % | TEMPERATURE: 98.3 F | DIASTOLIC BLOOD PRESSURE: 82 MMHG | WEIGHT: 146.8 LBS | RESPIRATION RATE: 16 BRPM | SYSTOLIC BLOOD PRESSURE: 120 MMHG | HEART RATE: 68 BPM | BODY MASS INDEX: 26 KG/M2

## 2020-01-01 DIAGNOSIS — R11.10 VOMITING AND DIARRHEA: ICD-10-CM

## 2020-01-01 DIAGNOSIS — D69.6 THROMBOCYTOPENIA (H): ICD-10-CM

## 2020-01-01 DIAGNOSIS — I10 BENIGN ESSENTIAL HYPERTENSION: ICD-10-CM

## 2020-01-01 DIAGNOSIS — R19.7 VOMITING AND DIARRHEA: ICD-10-CM

## 2020-01-01 DIAGNOSIS — I10 ESSENTIAL HYPERTENSION: ICD-10-CM

## 2020-01-01 DIAGNOSIS — E86.0 DEHYDRATION: ICD-10-CM

## 2020-01-01 DIAGNOSIS — S09.90XA CLOSED HEAD INJURY, INITIAL ENCOUNTER: ICD-10-CM

## 2020-01-01 DIAGNOSIS — E03.9 ACQUIRED HYPOTHYROIDISM: Primary | ICD-10-CM

## 2020-01-01 DIAGNOSIS — I25.10 CORONARY ARTERY DISEASE INVOLVING NATIVE CORONARY ARTERY OF NATIVE HEART WITHOUT ANGINA PECTORIS: ICD-10-CM

## 2020-01-01 DIAGNOSIS — R55 VASOVAGAL SYNCOPE: ICD-10-CM

## 2020-01-01 DIAGNOSIS — Z95.2 AORTIC VALVE PROSTHESIS PRESENT: ICD-10-CM

## 2020-01-01 LAB
ANION GAP SERPL CALCULATED.3IONS-SCNC: 4 MMOL/L (ref 3–14)
ANION GAP SERPL CALCULATED.3IONS-SCNC: 5 MMOL/L (ref 3–14)
BACTERIA SPEC CULT: NO GROWTH
BACTERIA SPEC CULT: NO GROWTH
BASOPHILS # BLD AUTO: 0 10E9/L (ref 0–0.2)
BASOPHILS NFR BLD AUTO: 0 %
BUN SERPL-MCNC: 23 MG/DL (ref 7–30)
BUN SERPL-MCNC: 26 MG/DL (ref 7–30)
C COLI+JEJUNI+LARI FUSA STL QL NAA+PROBE: NOT DETECTED
C DIFF TOX B STL QL: NEGATIVE
CALCIUM SERPL-MCNC: 8.6 MG/DL (ref 8.5–10.1)
CALCIUM SERPL-MCNC: 9.4 MG/DL (ref 8.5–10.1)
CHLORIDE SERPL-SCNC: 110 MMOL/L (ref 94–109)
CHLORIDE SERPL-SCNC: 111 MMOL/L (ref 94–109)
CO2 SERPL-SCNC: 22 MMOL/L (ref 20–32)
CO2 SERPL-SCNC: 27 MMOL/L (ref 20–32)
CREAT SERPL-MCNC: 0.96 MG/DL (ref 0.52–1.04)
CREAT SERPL-MCNC: 0.97 MG/DL (ref 0.52–1.04)
DIFFERENTIAL METHOD BLD: ABNORMAL
EC STX1 GENE STL QL NAA+PROBE: NOT DETECTED
EC STX2 GENE STL QL NAA+PROBE: NOT DETECTED
ENTERIC PATHOGEN COMMENT: ABNORMAL
EOSINOPHIL # BLD AUTO: 0 10E9/L (ref 0–0.7)
EOSINOPHIL NFR BLD AUTO: 0.5 %
ERYTHROCYTE [DISTWIDTH] IN BLOOD BY AUTOMATED COUNT: 14.3 % (ref 10–15)
ERYTHROCYTE [DISTWIDTH] IN BLOOD BY AUTOMATED COUNT: 14.7 % (ref 10–15)
GFR SERPL CREATININE-BSD FRML MDRD: 53 ML/MIN/{1.73_M2}
GFR SERPL CREATININE-BSD FRML MDRD: 54 ML/MIN/{1.73_M2}
GLUCOSE SERPL-MCNC: 100 MG/DL (ref 70–99)
GLUCOSE SERPL-MCNC: 138 MG/DL (ref 70–99)
HCT VFR BLD AUTO: 34.5 % (ref 35–47)
HCT VFR BLD AUTO: 43.2 % (ref 35–47)
HGB BLD-MCNC: 11.5 G/DL (ref 11.7–15.7)
HGB BLD-MCNC: 14 G/DL (ref 11.7–15.7)
IMM GRANULOCYTES # BLD: 0 10E9/L (ref 0–0.4)
IMM GRANULOCYTES NFR BLD: 0.2 %
INTERPRETATION ECG - MUSE: NORMAL
LYMPHOCYTES # BLD AUTO: 0.5 10E9/L (ref 0.8–5.3)
LYMPHOCYTES NFR BLD AUTO: 5.6 %
Lab: NORMAL
Lab: NORMAL
MCH RBC QN AUTO: 30.2 PG (ref 26.5–33)
MCH RBC QN AUTO: 30.6 PG (ref 26.5–33)
MCHC RBC AUTO-ENTMCNC: 32.4 G/DL (ref 31.5–36.5)
MCHC RBC AUTO-ENTMCNC: 33.3 G/DL (ref 31.5–36.5)
MCV RBC AUTO: 92 FL (ref 78–100)
MCV RBC AUTO: 93 FL (ref 78–100)
MONOCYTES # BLD AUTO: 0.3 10E9/L (ref 0–1.3)
MONOCYTES NFR BLD AUTO: 3.1 %
NEUTROPHILS # BLD AUTO: 8 10E9/L (ref 1.6–8.3)
NEUTROPHILS NFR BLD AUTO: 90.6 %
NOROV GI+II ORF1-ORF2 JNC STL QL NAA+PR: ABNORMAL
NRBC # BLD AUTO: 0 10*3/UL
NRBC BLD AUTO-RTO: 0 /100
PLATELET # BLD AUTO: 105 10E9/L (ref 150–450)
PLATELET # BLD AUTO: 126 10E9/L (ref 150–450)
POTASSIUM SERPL-SCNC: 3.9 MMOL/L (ref 3.4–5.3)
POTASSIUM SERPL-SCNC: 4.6 MMOL/L (ref 3.4–5.3)
PROCALCITONIN SERPL-MCNC: 2.32 NG/ML
RBC # BLD AUTO: 3.76 10E12/L (ref 3.8–5.2)
RBC # BLD AUTO: 4.63 10E12/L (ref 3.8–5.2)
RVA NSP5 STL QL NAA+PROBE: NOT DETECTED
SALMONELLA SP RPOD STL QL NAA+PROBE: NOT DETECTED
SHIGELLA SP+EIEC IPAH STL QL NAA+PROBE: NOT DETECTED
SODIUM SERPL-SCNC: 138 MMOL/L (ref 133–144)
SODIUM SERPL-SCNC: 141 MMOL/L (ref 133–144)
SPECIMEN SOURCE: NORMAL
V CHOL+PARA RFBL+TRKH+TNAA STL QL NAA+PR: NOT DETECTED
WBC # BLD AUTO: 7.1 10E9/L (ref 4–11)
WBC # BLD AUTO: 8.9 10E9/L (ref 4–11)
Y ENTERO RECN STL QL NAA+PROBE: NOT DETECTED

## 2020-01-01 PROCEDURE — 25800030 ZZH RX IP 258 OP 636: Performed by: PHYSICIAN ASSISTANT

## 2020-01-01 PROCEDURE — 93306 TTE W/DOPPLER COMPLETE: CPT

## 2020-01-01 PROCEDURE — 96361 HYDRATE IV INFUSION ADD-ON: CPT

## 2020-01-01 PROCEDURE — 99285 EMERGENCY DEPT VISIT HI MDM: CPT | Mod: 25

## 2020-01-01 PROCEDURE — 99220 ZZC INITIAL OBSERVATION CARE,LEVL III: CPT | Performed by: INTERNAL MEDICINE

## 2020-01-01 PROCEDURE — 85027 COMPLETE CBC AUTOMATED: CPT | Performed by: PHYSICIAN ASSISTANT

## 2020-01-01 PROCEDURE — 93270 REMOTE 30 DAY ECG REV/REPORT: CPT

## 2020-01-01 PROCEDURE — 25500064 ZZH RX 255 OP 636: Performed by: INTERNAL MEDICINE

## 2020-01-01 PROCEDURE — 36415 COLL VENOUS BLD VENIPUNCTURE: CPT | Performed by: EMERGENCY MEDICINE

## 2020-01-01 PROCEDURE — 93272 ECG/REVIEW INTERPRET ONLY: CPT | Performed by: INTERNAL MEDICINE

## 2020-01-01 PROCEDURE — 80048 BASIC METABOLIC PNL TOTAL CA: CPT | Performed by: INTERNAL MEDICINE

## 2020-01-01 PROCEDURE — 99217 ZZC OBSERVATION CARE DISCHARGE: CPT | Performed by: PHYSICIAN ASSISTANT

## 2020-01-01 PROCEDURE — 93005 ELECTROCARDIOGRAM TRACING: CPT

## 2020-01-01 PROCEDURE — 70450 CT HEAD/BRAIN W/O DYE: CPT

## 2020-01-01 PROCEDURE — 25000132 ZZH RX MED GY IP 250 OP 250 PS 637: Mod: GY | Performed by: INTERNAL MEDICINE

## 2020-01-01 PROCEDURE — 36415 COLL VENOUS BLD VENIPUNCTURE: CPT | Performed by: INTERNAL MEDICINE

## 2020-01-01 PROCEDURE — 85025 COMPLETE CBC W/AUTO DIFF WBC: CPT | Performed by: EMERGENCY MEDICINE

## 2020-01-01 PROCEDURE — 99495 TRANSJ CARE MGMT MOD F2F 14D: CPT | Mod: 25 | Performed by: FAMILY MEDICINE

## 2020-01-01 PROCEDURE — 93306 TTE W/DOPPLER COMPLETE: CPT | Mod: 26 | Performed by: INTERNAL MEDICINE

## 2020-01-01 PROCEDURE — 25800030 ZZH RX IP 258 OP 636: Performed by: EMERGENCY MEDICINE

## 2020-01-01 PROCEDURE — 87506 IADNA-DNA/RNA PROBE TQ 6-11: CPT | Performed by: INTERNAL MEDICINE

## 2020-01-01 PROCEDURE — 99204 OFFICE O/P NEW MOD 45 MIN: CPT | Mod: 25 | Performed by: INTERNAL MEDICINE

## 2020-01-01 PROCEDURE — 96360 HYDRATION IV INFUSION INIT: CPT

## 2020-01-01 PROCEDURE — 87493 C DIFF AMPLIFIED PROBE: CPT | Performed by: INTERNAL MEDICINE

## 2020-01-01 PROCEDURE — 84145 PROCALCITONIN (PCT): CPT | Performed by: PHYSICIAN ASSISTANT

## 2020-01-01 PROCEDURE — 80048 BASIC METABOLIC PNL TOTAL CA: CPT | Performed by: EMERGENCY MEDICINE

## 2020-01-01 PROCEDURE — 87040 BLOOD CULTURE FOR BACTERIA: CPT | Performed by: PHYSICIAN ASSISTANT

## 2020-01-01 PROCEDURE — 36415 COLL VENOUS BLD VENIPUNCTURE: CPT | Performed by: PHYSICIAN ASSISTANT

## 2020-01-01 PROCEDURE — G0378 HOSPITAL OBSERVATION PER HR: HCPCS

## 2020-01-01 RX ORDER — ONDANSETRON 2 MG/ML
4 INJECTION INTRAMUSCULAR; INTRAVENOUS EVERY 6 HOURS PRN
Status: DISCONTINUED | OUTPATIENT
Start: 2020-01-01 | End: 2020-01-01 | Stop reason: HOSPADM

## 2020-01-01 RX ORDER — CHOLECALCIFEROL (VITAMIN D3) 50 MCG
1 TABLET ORAL DAILY
COMMUNITY

## 2020-01-01 RX ORDER — PROCHLORPERAZINE MALEATE 5 MG
5 TABLET ORAL EVERY 6 HOURS PRN
Status: DISCONTINUED | OUTPATIENT
Start: 2020-01-01 | End: 2020-01-01

## 2020-01-01 RX ORDER — ATORVASTATIN CALCIUM 20 MG/1
TABLET, FILM COATED ORAL
Qty: 93 TABLET | Refills: 0 | Status: SHIPPED | OUTPATIENT
Start: 2020-01-01

## 2020-01-01 RX ORDER — CLOPIDOGREL BISULFATE 75 MG/1
75 TABLET ORAL DAILY
Status: DISCONTINUED | OUTPATIENT
Start: 2020-01-01 | End: 2020-01-01 | Stop reason: HOSPADM

## 2020-01-01 RX ORDER — ASPIRIN 81 MG/1
81 TABLET ORAL EVERY EVENING
Status: DISCONTINUED | OUTPATIENT
Start: 2020-01-01 | End: 2020-01-01

## 2020-01-01 RX ORDER — LEVOTHYROXINE SODIUM 50 UG/1
50 TABLET ORAL DAILY
Status: DISCONTINUED | OUTPATIENT
Start: 2020-01-01 | End: 2020-01-01 | Stop reason: HOSPADM

## 2020-01-01 RX ORDER — LIDOCAINE 40 MG/G
CREAM TOPICAL
Status: DISCONTINUED | OUTPATIENT
Start: 2020-01-01 | End: 2020-01-01 | Stop reason: HOSPADM

## 2020-01-01 RX ORDER — NALOXONE HYDROCHLORIDE 0.4 MG/ML
.1-.4 INJECTION, SOLUTION INTRAMUSCULAR; INTRAVENOUS; SUBCUTANEOUS
Status: DISCONTINUED | OUTPATIENT
Start: 2020-01-01 | End: 2020-01-01 | Stop reason: HOSPADM

## 2020-01-01 RX ORDER — NITROGLYCERIN 0.4 MG/1
0.4 TABLET SUBLINGUAL EVERY 5 MIN PRN
Status: DISCONTINUED | OUTPATIENT
Start: 2020-01-01 | End: 2020-01-01 | Stop reason: HOSPADM

## 2020-01-01 RX ORDER — ONDANSETRON 4 MG/1
4 TABLET, ORALLY DISINTEGRATING ORAL EVERY 6 HOURS PRN
Status: DISCONTINUED | OUTPATIENT
Start: 2020-01-01 | End: 2020-01-01 | Stop reason: HOSPADM

## 2020-01-01 RX ORDER — PROCHLORPERAZINE 25 MG
12.5 SUPPOSITORY, RECTAL RECTAL EVERY 12 HOURS PRN
Status: DISCONTINUED | OUTPATIENT
Start: 2020-01-01 | End: 2020-01-01 | Stop reason: HOSPADM

## 2020-01-01 RX ORDER — SODIUM CHLORIDE 9 MG/ML
INJECTION, SOLUTION INTRAVENOUS CONTINUOUS
Status: DISCONTINUED | OUTPATIENT
Start: 2020-01-01 | End: 2020-01-01 | Stop reason: HOSPADM

## 2020-01-01 RX ORDER — ACETAMINOPHEN 325 MG/1
650 TABLET ORAL EVERY 4 HOURS PRN
Status: DISCONTINUED | OUTPATIENT
Start: 2020-01-01 | End: 2020-01-01 | Stop reason: HOSPADM

## 2020-01-01 RX ORDER — ACETAMINOPHEN 650 MG/1
650 SUPPOSITORY RECTAL EVERY 4 HOURS PRN
Status: DISCONTINUED | OUTPATIENT
Start: 2020-01-01 | End: 2020-01-01 | Stop reason: HOSPADM

## 2020-01-01 RX ORDER — ASPIRIN 81 MG/1
81 TABLET ORAL EVERY EVENING
Status: DISCONTINUED | OUTPATIENT
Start: 2020-01-01 | End: 2020-01-01 | Stop reason: HOSPADM

## 2020-01-01 RX ORDER — METOPROLOL SUCCINATE 25 MG/1
25 TABLET, EXTENDED RELEASE ORAL DAILY
Status: DISCONTINUED | OUTPATIENT
Start: 2020-01-01 | End: 2020-01-01 | Stop reason: HOSPADM

## 2020-01-01 RX ORDER — METOPROLOL SUCCINATE 50 MG/1
25 TABLET, EXTENDED RELEASE ORAL DAILY
Qty: 45 TABLET | Refills: 3 | Status: SHIPPED | OUTPATIENT
Start: 2020-01-01

## 2020-01-01 RX ADMIN — SODIUM CHLORIDE: 9 INJECTION, SOLUTION INTRAVENOUS at 09:29

## 2020-01-01 RX ADMIN — HUMAN ALBUMIN MICROSPHERES AND PERFLUTREN 9 ML: 10; .22 INJECTION, SOLUTION INTRAVENOUS at 14:41

## 2020-01-01 RX ADMIN — METOPROLOL SUCCINATE 25 MG: 25 TABLET, EXTENDED RELEASE ORAL at 09:27

## 2020-01-01 RX ADMIN — LEVOTHYROXINE SODIUM 50 MCG: 50 TABLET ORAL at 08:37

## 2020-01-01 RX ADMIN — ACETAMINOPHEN 650 MG: 325 TABLET, FILM COATED ORAL at 08:37

## 2020-01-01 RX ADMIN — CLOPIDOGREL BISULFATE 75 MG: 75 TABLET, FILM COATED ORAL at 08:37

## 2020-01-01 RX ADMIN — CLOPIDOGREL BISULFATE 75 MG: 75 TABLET, FILM COATED ORAL at 14:54

## 2020-01-01 RX ADMIN — SODIUM CHLORIDE 2000 ML: 9 INJECTION, SOLUTION INTRAVENOUS at 08:00

## 2020-01-01 RX ADMIN — METOPROLOL SUCCINATE 25 MG: 25 TABLET, EXTENDED RELEASE ORAL at 14:54

## 2020-01-01 RX ADMIN — ACETAMINOPHEN 650 MG: 325 TABLET, FILM COATED ORAL at 15:01

## 2020-01-01 RX ADMIN — ASPIRIN 81 MG: 81 TABLET, DELAYED RELEASE ORAL at 19:24

## 2020-01-01 RX ADMIN — ACETAMINOPHEN 650 MG: 325 TABLET, FILM COATED ORAL at 00:31

## 2020-01-01 RX ADMIN — LEVOTHYROXINE SODIUM 50 MCG: 50 TABLET ORAL at 14:53

## 2020-01-01 ASSESSMENT — ENCOUNTER SYMPTOMS
NAUSEA: 1
VOMITING: 1
ABDOMINAL PAIN: 1
HEADACHES: 0
SHORTNESS OF BREATH: 0
NECK PAIN: 0
FEVER: 0
SPEECH DIFFICULTY: 0
DIARRHEA: 1

## 2020-01-01 ASSESSMENT — MIFFLIN-ST. JEOR
SCORE: 1105.89
SCORE: 1104.53

## 2020-03-07 PROBLEM — R55 SYNCOPE: Status: ACTIVE | Noted: 2020-01-01

## 2020-03-07 NOTE — ED NOTES
Bed: ED14  Expected date: 3/7/20  Expected time: 7:38 AM  Means of arrival: Ambulance  Comments:  [id number] n/v/d  ETA [id number]

## 2020-03-07 NOTE — H&P
[facility]    History and Physical - Hospitalist Service       Date of Admission:  3/7/2020    Assessment & Plan   Abbi Crowe is a 84 year-old female with history of coronary artery disease status post CABG, aortic valve stenosis status post bioprosthetic aortic valve replacement, thrombocytopenia, hypothyroidism, hypertension, hyperlipidemia who presents with acute onset of diarrhea, nausea and vomiting with 2 syncopal episodes.  Admitted on 3/7/2020.     Gastroenteritis, probable viral  Presents with the acute onset of watery diarrhea followed by nausea and vomiting.  She reports her 's nursing home has recently been dealing with an outbreak of diarrheal illness and she has visited him during that time.  Her last antibiotic use was a single dose when she went to the dentist a month or 2 ago.  Denies any recent travel.  Denies any concerning food intake.  Prior to the morning of admission she was in her usual state of health.  Has felt chilled, denies any measured fevers.  Has some mild abdominal soreness after vomiting, exam benign.  - Clear liquid diet, advance as tolerated  - Antiemetics PRN  - C. difficile PCR, enteric bacteria/viral panel pending  - Enteric precautions  - Recheck BMP in a.m.    Syncope, suspect vasovagal  Exertional chest pressure    Patient reports 2 syncopal episodes that occurred associated with vomiting.  Also with onset of diarrhea shortly before syncope.  Suspect vasovagal based on history, although patient with systolic murmur on exam (known AVR as below) and reports a year and a half of chest pressure with exertion.  Of note, the patient reports that she was evaluated for this chest pressure by her cardiologist which included (per CareEverywhere) nuclear medicine stress testing which was negative, echocardiogram with normal EF, bioprosthetic aortic valve replacement without stenosis and mild regurgitation, Holter monitor with average heart rates in 60s.   She notes her current exertional symptoms are the same as she was experiencing at the time of her previous work-up.    - Echocardiogram   - Orthostatic vital signs following IV fluids negative, will hold on additional IV fluids  - Telemetry overnight  - Given the chronicity of her chest pressure with previous negative work-up, if echocardiogram is unremarkable will defer further work-up to the outpatient setting    Facial contusion   Head CT negative    Coronary artery disease s/p CABG  Aortic valve stenosis status post bioprosthetic aortic valve replacement  Chronic LBBB  Dyslipidemia   - Continue prior to admission aspirin, clopidogrel, metoprolol XL   - Hold prior to admission statin while observation status    Hypothyroidism  - Continue prior to admission levothyroxine       Diet: Clear liquid diet, advance as tolerated  DVT Prophylaxis: Observation patient, short stay anticipated  Baca Catheter: not present  Code Status: DNR/DNI - Discussed with patient     Disposition Plan   Expected discharge: [facility] [place] to observation status. Anticipate discharge within 24 hours if clinically improved.   Entered: Joby Baptiste MD 03/07/2020, 10:24 AM     The patient's care was discussed with the Patient.    Joby Baptiste MD  [facility]    ______________________________________________________________________    Chief Complaint   Diarrhea, nausea, vomiting, syncopal episode x2    History of Present Illness   Abbi Crowe is a 84 year old female who presents with the above chief complaint.    History is obtained from the patient.  The patient reports that she went to bed in her usual state of health.  She awoke early the morning of admission with an urgent need to have a bowel movement, went to the bathroom and had diarrhea followed by nausea and episode of vomiting.  She reports that she was bending over to vomit into her wastebasket and the next thing she knew she woke up on the floor of her  bathroom.  She was able to get to her feet and went back to her bed to lay down.  She recalls listening to the radio, and the next thing she knew she woke up on the floor.  This was associated with an episode of vomiting as she noticed vomit on the floor.  She estimates that she was on the floor for about half an hour.  She subsequently summoned EMS who brought her to [facility] for further evaluation.  She reports that for the past year and a half she has had exertional chest pressure.  She reports that this had previously been worked up by her cardiologist, with negative testing.  Her symptoms have not changed since that time.  She has had 1-2 syncopal episodes before this in her life.  The first episode occurred around 2007 when she was in [place].  She was at rest talking on the phone, when she appeared to lose consciousness.  She was admitted to the hospital at that time with negative work-up.  She had a subsequent possible syncopal episode about 5 years ago when she drove over a curb on her way to her primary care appointment.  It was suspected that she fell asleep which led to the episode, though she is not certain that she did not pass out.  She has not had any further episodes prior to today.  She reports her  [facility] and his facility has recently been dealing with an outbreak of diarrheal illness.  Her  did not become ill with this, though she has been visiting the facility.  She denies any recent travel.  She took a one-time dose of amoxicillin her last dentist appointment in January or February.  She denies any concerning food intake.    In the Emergency Department, the patient was seen by Dr. Vashti Kunz, with whom I discussed the patient's presenting symptoms and emergency department course.  Initial vital signs were a temperature of 98.6F, HR 61, /73, RR 20, SpO2 93% on room air. Work-up included negative head CT, largely unremarkable labs, EKG  with chronic left bundle branch block.  She received 2 L of IV fluids and hospitalists were contacted for admission to the hospital.     Review of Systems    Complete 10 point review of systems assessed and negative except as noted in HPI.    Past Medical History    I have reviewed this patient's medical history and updated it with pertinent information if needed.   Past Medical History:   Diagnosis Date     Allergic rhinitis, cause unspecified     Allergic rhinitis     Aortic valve prosthesis present 2007    ANW     CAD (coronary artery disease)      Hyperlipidaemia LDL goal < 100      Insomnia      Mixed hyperlipidemia     Hyperlipidemia     Postmenopause     in early 51     Sciatica      Thrombocytopenia (H) 6/3/2018     Unspecified essential hypertension     Essential hypertension     Unspecified hypothyroidism     Hypothyroidism       Past Surgical History   I have reviewed this patient's surgical history and updated it with pertinent information if needed.  Past Surgical History:   Procedure Laterality Date     AORTIC VALVE REPLACEMENT      1 vessel CABG     D & C       PHACOEMULSIFICATION CLEAR CORNEA WITH STANDARD INTRAOCULAR LENS IMPLANT  6/6/2012    Procedure:PHACOEMULSIFICATION CLEAR CORNEA WITH STANDARD INTRAOCULAR LENS IMPLANT; LEFT PHACOEMULSIFICATION CLEAR CORNEA WITH STANDARD INTRAOCULAR LENS IMPLANT ; Surgeon:LUIS MANCINI; Location:[facility]         Social History   I have reviewed this patient's social history and updated it with pertinent information if needed.  Social History     Tobacco Use     Smoking status: Never Smoker     Smokeless tobacco: Never Used   Substance Use Topics     Alcohol use: No     Alcohol/week: 0.0 standard drinks     Drug use: No     Lives independently.  Typically does not use an assistive device for ambulation.   has advanced dementia and lives in a nursing home.  She has 1 son.    Family History   I have reviewed this patient's family history and updated it with  pertinent information if needed.   Family History   Problem Relation Age of Onset     Endocrine Disease Brother      Cancer Sister         esophageal      Heart Disease Brother      Osteoporosis Sister      Heart Disease Mother         CHF     Heart Disease Father        Prior to Admission Medications   Prior to Admission Medications   Prescriptions Last Dose Informant Patient Reported? Taking?   Multiple Vitamins-Minerals (PRESERVISION AREDS 2 PO) 3/6/2020 at Unknown time Self Yes Yes   Sig: Take 1 tablet by mouth 2 times daily   amoxicillin (AMOXIL) 500 MG capsule More than a month at Unknown time Self Yes No   Sig: Take 2,000 mg by mouth as needed Take 4 capsules by mouth 1 hour prior to dental appointments   aspirin 81 MG tablet 3/6/2020 at evening Self Yes Yes   Sig: Take 1 tablet by mouth every evening    atorvastatin (LIPITOR) 20 MG tablet 3/6/2020 at morning Self No Yes   Sig: Take 1 tablet (20 mg) by mouth daily   calcium-vitamin D (CALTRATE 600+D) 600-400 MG-UNIT per tablet 3/6/2020 at evening Self Yes Yes   Sig: Take 1 tablet by mouth 2 times daily.   clopidogrel (PLAVIX) 75 MG tablet 3/6/2020 at Unknown time Self No Yes   Sig: Take 1 tablet (75 mg) by mouth daily   levothyroxine (SYNTHROID/LEVOTHROID) 50 MCG tablet 3/6/2020 at Unknown time Pharmacy No Yes   Sig: Take 1 tablet (50 mcg) by mouth daily   metoprolol succinate ER (TOPROL-XL) 50 MG 24 hr tablet 3/6/2020 at morning Pharmacy No Yes   Sig: Take 0.5 tablets (25 mg) by mouth daily   nitroGLYcerin (NITROSTAT) 0.4 MG sublingual tablet More than a month at Unknown time Self No No   Sig: For chest pain place 1 tablet under the tongue every 5 minutes for 3 doses. If symptoms persist 5 minutes after 1st dose call 911.   vitamin D3 (CHOLECALCIFEROL) 2000 units (50 mcg) tablet 3/6/2020 at morning Self Yes Yes   Sig: Take 1 tablet by mouth daily   zolpidem (AMBIEN) 10 MG tablet More than a month at Unknown time Self No No   Sig: Take 0.5 tablets (5 mg) by  mouth nightly as needed for sleep      Facility-Administered Medications: None     Allergies   Allergies   Allergen Reactions     Lisinopril Cough     Yellow Dye Itching       Physical Exam   Vital Signs: Temp: 98.6  F (37  C) Temp src: Oral BP: 116/62 Pulse: 78   Resp: 16 SpO2: 92 % O2 Device: None (Room air)    Weight: 150 lbs 0 oz    Constitutional: Well-appearing, NAD  Eyes: PERRL, EOMI  HENT: Oropharynx clear, MMM  Respiratory: Clear to auscultation bilaterally, good air movement, normal effort   Cardiovascular: RRR, 3 out of 6 systolic murmur heard best at right upper sternal border. No peripheral edema.   GI: Soft, mild diffuse tenderness without rebound tenderness or guarding.  Skin: Warm, dry, no apparent rashes  Neurologic: Alert. Responding to questions appropriately. Following commands. Oriented to person, place and time. Face symmetric. Tongue midline. 5/5 upper and lower extremity strength symmetric bilaterally.  Psychiatric: Normal affect, appropriate      Data   Data reviewed today: I reviewed all medications, new labs and imaging results over the last 24 hours. I personally reviewed the EKG tracing showing sinus rhythm with LBBB.    Recent Labs   Lab 03/07/20  0810   WBC 8.9   HGB 14.0   MCV 93   *      POTASSIUM 4.6   CHLORIDE 110*   CO2 27   BUN 26   CR 0.96   ANIONGAP 4   DELMI 9.4   *       Recent Results (from the past 24 hour(s))   Head CT w/o contrast    Narrative    CT SCAN OF THE HEAD WITHOUT CONTRAST   3/7/2020 9:41 AM     HISTORY: Check for traumatic bleed or frontal skull fracture, syncope  with head injury.    TECHNIQUE:  Axial images of the head and coronal reformations without  IV contrast material. Radiation dose for this scan was reduced using  automated exposure control, adjustment of the mA and/or kV according  to patient size, or iterative reconstruction technique.    COMPARISON: None.    FINDINGS: Mild volume loss is present. White matter  hypoattenuation  likely represents moderate chronic small vessel ischemic change. The  cerebral hemispheres, brainstem, and cerebellum otherwise demonstrate  normal morphology and attenuation. No evidence of acute ischemia,  hemorrhage, mass, mass effect or hydrocephalus. The visualized  calvarium, tympanic cavities, mastoid cavities, and extracranial soft  tissues are unremarkable. Left sphenoid sinus retention cyst noted.      Impression    IMPRESSION:  No acute intracranial abnormality.    BINA BARRIENTOS MD

## 2020-03-07 NOTE — PROGRESS NOTES
RECEIVING UNIT ED HANDOFF REVIEW    ED Nurse Handoff Report was reviewed by: Terrell Santoro, RN on March 7, 2020 at 12:02 PM

## 2020-03-07 NOTE — ED TRIAGE NOTES
"Pt awoke around 0300 with diarrhea multiple times during night and then N/V with last time \"passed out in bathroom\" awoke on floor got up and called EMS - bruise over right eye -   "

## 2020-03-07 NOTE — ED NOTES
"[facility]  ED Nurse Handoff Report    ED Chief complaint: Nausea, Vomiting, & Diarrhea      ED Diagnosis:   Final diagnoses:   Vasovagal syncope   Vomiting and diarrhea   Dehydration       Code Status: Full Code    Allergies:   Allergies   Allergen Reactions     Lisinopril Cough     Yellow Dye Itching       Patient Story:     Tita  84 year old female  presents with nausea, vomiting, and diarrhea via EMS. The patient reports that she awoke approximately at 0300 with diarrhea. She reports that she had 5-6 episodes of diarrhea when she began feeling nauseous. The patient states she bent down to vomit in waste basket had a syncopal episode because pt awoke on bathroom floor.  The patient  is uncertain as to how long she was lying on the ground.  The patient states that she recently visited her , who [facility] where they had an outbreak of \"intestinal flu\"  2 weeks ago.     Assessment:    Alert and oriented times 3  Generalized weakness  No nausea no vomiting no diarrhea in the ER      Treatments and/or interventions provided:   NS 2000 ml    Patient's response to treatments and/or interventions:   Feeling better    To be done/followed up on inpatient unit:      Does this patient have any cognitive concerns?    Activity level - Baseline/Home:  Independent  Activity Level - Current:   Stand with Assist    Patient's Preferred language: English   Needed?: No    Isolation: None and Enteric  Infection: Not Applicable  Bariatric?: No    Vital Signs:   Vitals:    03/07/20 1000 03/07/20 1015 03/07/20 1030 03/07/20 1036   BP: 116/62 134/54 (!) 141/56 (!) 128/98   Pulse: 78 84 83 79   Resp:    18   Temp:       TempSrc:       SpO2: 92% 92% 91% 93%   Weight:       Height:           Cardiac Rhythm:Cardiac Rhythm: Normal sinus rhythm    Was the PSS-3 completed:   Yes  What interventions are required if any?               Family Comments:   none  OBS " brochure/video discussed/provided to patient/family: Yes              Name of person given brochure if not patient:                 Relationship to patient:       For the majority of the shift this patient's behavior was Green.   Behavioral interventions performed were   .    ED NURSE PHONE NUMBER:   [phone]

## 2020-03-07 NOTE — PHARMACY-ADMISSION MEDICATION HISTORY
Pharmacy Medication History  Admission medication history interview status for the 3/7/2020  admission is complete. See EPIC admission navigator for prior to admission medications     Medication history sources: Patient, Surescripts and Pharmacy ([facility] [address] [phone])  Medication history source reliability: Moderate  Adherence assessment: Good    Significant changes made to the medication list:  No changes.       Additional medication history information:   - Zolpidem - patient states that she takes zolpidem when she is on vacation to ensure that she sleeps.   - Metoprolol succinate is 1/2 of a 50 mg tablet for a total dose of 25 mg.  - patient is concerned about the cost of her medications, and would like her vitamin D and calcium not ordered while inpatient if possible.        Medication reconciliation completed by provider prior to medication history? No    Time spent in this activity: 30 minutes    Prior to Admission medications    Medication Sig Last Dose Taking? Auth Provider   aspirin 81 MG tablet Take 1 tablet by mouth every evening  3/6/2020 at evening Yes Reported, Patient   atorvastatin (LIPITOR) 20 MG tablet Take 1 tablet (20 mg) by mouth daily 3/6/2020 at morning Yes Darlyn Ching MD   calcium-vitamin D (CALTRATE 600+D) 600-400 MG-UNIT per tablet Take 1 tablet by mouth 2 times daily. 3/6/2020 at evening Yes Reported, Patient   clopidogrel (PLAVIX) 75 MG tablet Take 1 tablet (75 mg) by mouth daily 3/6/2020 at Unknown time Yes Darlyn Ching MD   levothyroxine (SYNTHROID/LEVOTHROID) 50 MCG tablet Take 1 tablet (50 mcg) by mouth daily 3/6/2020 at Unknown time Yes Darlyn Ching MD   metoprolol succinate ER (TOPROL-XL) 50 MG 24 hr tablet Take 0.5 tablets (25 mg) by mouth daily 3/6/2020 at morning Yes Darlyn Ching MD   Multiple Vitamins-Minerals (PRESERVISION AREDS 2 PO) Take 1 tablet by mouth 2 times daily 3/6/2020 at Unknown time Yes Unknown, Entered  By History   vitamin D3 (CHOLECALCIFEROL) 2000 units (50 mcg) tablet Take 1 tablet by mouth daily 3/6/2020 at morning Yes Unknown, Entered By History   amoxicillin (AMOXIL) 500 MG capsule Take 2,000 mg by mouth as needed Take 4 capsules by mouth 1 hour prior to dental appointments More than a month at Unknown time  Reported, Patient   nitroGLYcerin (NITROSTAT) 0.4 MG sublingual tablet For chest pain place 1 tablet under the tongue every 5 minutes for 3 doses. If symptoms persist 5 minutes after 1st dose call 911. More than a month at Unknown time  Darlyn Ching MD   zolpidem (AMBIEN) 10 MG tablet Take 0.5 tablets (5 mg) by mouth nightly as needed for sleep More than a month at Unknown time  Darlyn Ching MD

## 2020-03-07 NOTE — PLAN OF CARE
Observation goals PRIOR TO DISCHARGE    Comments: List all goals to be met before discharge home     - Nausea/vomiting (diarrhea if present) improved: Met    - Tolerating oral intake to maintain hydration : Met    - Vital signs normal or at patient baseline and orthostatic vitals are normal and patient not lightheaded with standing : Met    - Diagnostic tests and consults completed and resulted if ordered : Not met    - Adequate pain control on oral analgesia: Met    - Tolerating oral antibiotics if ordered : N/A    - Safe disposition plan has been identified : Yes    - Nurse to notify provider when observation goals have been met and patient is ready for discharge.

## 2020-03-07 NOTE — ED PROVIDER NOTES
"  History     Chief Complaint:  Nausea, Vomiting, & Diarrhea      HPI   Abbi Crowe is a 84 year old female with a history of CAD, hypertension, and hypothyroidism who presents with nausea, vomiting, and diarrhea. The patient reports that she awoke approximately five hours prior to arrival with diarrhea. She reports that she had 5-6 episodes of diarrhea when she began feeling nauseous. The patient reports that she bent down to grab the waste basket when she had an episode of syncope, causing her to fall forward and hit the right side of her head. She continues that she woke up on the floor and was able to get herself back to bed. Shortly after this, the patient reports that she again became nauseous. She went to reach for the waste basket in her bedroom and had another syncopal episode. The patient is unsure if she fell out of bed and struck her head again or if she passed out in bed and slid onto the floor, but she awoke lying on her bedroom floor. This prompted her to call EMS and come to the ED for evaluation. Here, the patient reports some generalized abdominal pain from her vomiting episodes. She is uncertain as to how long she was lying on the ground but feels that it could have been a few hours. She notes that she recently visited her , who is at the [facility], and that they had an outbreak of \"intestinal flu\" there. The patient denies any chest pain or shortness of breath preceding or following her fall. She denies headache, any new neck pain, or changes to her vision or speech as well as any recent fevers.     Allergies:  Lisinopril  Yellow Dye     Medications:    Amoxicillin  Aspirin   Lipitor   Caltrate  Vitamin D  Plavix  Lactobacillus   Levothyroxine   Metoprolol  Multivitamin   Nitroglycerin   Ambien     Past Medical History:    Allergic rhinitis  Aortic valve prosthesis present  CAD  Insomnia   Hyperlipidemia  Postmenopause  Sciatica  Thrombocytopenia  Hypertensin   Hypothyroidism " "  B12 deficiency anemia     Past Surgical History:    CABG  D&C  Phacoemulsification clear cornea with iol     Family History:    The patient reports a maternal history of CHF. The patient reports a paternal history of heart disease. The patient reports a fraternal history of endocrine disease, esophageal cancer, heart disease, and osteoporosis. The patient denies any other relevant family history.     Social History:  The patient is . She lives in an independent-living co-op The patient denies tobacco, alcohol, and drug use.     Review of Systems   Constitutional: Negative for fever.   Eyes: Negative for visual disturbance.   Respiratory: Negative for shortness of breath.    Cardiovascular: Negative for chest pain.   Gastrointestinal: Positive for abdominal pain, diarrhea, nausea and vomiting.   Musculoskeletal: Negative for neck pain.   Neurological: Positive for syncope. Negative for speech difficulty and headaches.   All other systems reviewed and are negative.    Physical Exam   Vitals:  Patient Vitals for the past 24 hrs:   BP Temp Temp src Pulse Resp SpO2 Height Weight   03/07/20 1036 (!) 128/98 -- -- 79 18 93 % -- --   03/07/20 1030 (!) 141/56 -- -- 83 -- 91 % -- --   03/07/20 1015 134/54 -- -- 84 -- 92 % -- --   03/07/20 1000 116/62 -- -- 78 -- 92 % -- --   03/07/20 0943 (!) 147/53 -- -- 65 -- 91 % -- --   03/07/20 0900 (!) 81/44 -- -- 64 -- 91 % -- --   03/07/20 0845 100/63 -- -- 65 -- 93 % -- --   03/07/20 0830 120/46 -- -- 61 16 93 % -- --   03/07/20 0815 126/58 -- -- 60 16 94 % -- --   03/07/20 0756 122/89 -- -- 67 16 94 % -- --   03/07/20 0751 132/73 98.6  F (37  C) Oral 61 20 93 % 1.6 m (5' 3\") 68 kg (150 lb)      Physical Exam  Nursing note and vitals reviewed.  Constitutional:  Appears well-developed and well-nourished.   HENT:   Head:    Ecchymosis and swelling to the right lower frontal region above the right eye brow.      No bony tenderness.    Mouth/Throat:   Oropharynx is clear. Dry " mucous membranes. No oropharyngeal exudate.   Eyes:    Pupils are equal, round, and reactive to light.   Neck:    Normal range of motion. Neck supple.      No tracheal deviation present. No thyromegaly present.   Cardiovascular:  Normal rate, regular rhythm, no murmur   Pulmonary/Chest: Breath sounds are clear and equal without wheezes or crackles.  Abdominal:   Soft. Bowel sounds are normal. Exhibits no distension and      no mass. There is no tenderness.      There is no rebound and no guarding.   Musculoskeletal:  Exhibits no edema.   Lymphadenopathy:  No cervical adenopathy.   Neurological:   Alert and oriented to person, place, and time. GCS 15.  CN 2-12 intact.  and proximal upper extremity strength strong and equal.  Bilateral lower extremity strength strong and equal, including strong dorsiflexion and plantarflexion strength.  Sensation intact and equal to the face, arms and legs.  No facial droop or weakness. Normal speech.  Follows commands and answers questions normally.    Skin:    Skin is warm and dry. No rash noted. No pallor.      Emergency Department Course   ECG:  Indication: Syncope  Findings: Normal sinus rhythm. Left bundle branch block. Abnormal ECG. ECG read at 8:20 AM by Dr. Kunz.  Ventricular rate: 60 bpm  FL Interval: 164 ms  QRS duration: 134 ms  QT/Qtc: 458/458 ms   R-wave axis: 8     Imaging:  Radiographic findings were communicated with the patient who voiced understanding of the findings.    Head CT w/o contrast: IMPRESSION:  No acute intracranial abnormality. Report per radiology.     Laboratory:  CBC:  (low) o/w WNL. (WBC 8.9, HGB 14.0)   BMP: Chloride 110 (high), Glucose 138 (high), GFR 54 (low) o/w WNL (Creatinine 0.96)     Enteric Bacteria and Virus Panel, stool: Pending  C Difficile Toxin PCR: Pending     Interventions:  Normal Saline 1L IV injection     Emergency Department Course:  Nursing notes and vitals reviewed. I performed an exam of the patient as documented  "above.   IV inserted and blood drawn. The patient was placed on continuous cardiac monitoring and pulse oximetry.   8:00 AM The patient was given NLS IV, dosage as noted above.    10:02 AM CT scan obtained.  Results as above.  Findings discussed with the patient.   10:40 AM Discussed the case with Dr. Baptiste, on call for hospitalist services.   Findings and plan explained to the Patient who consents to admission. Discussed the patient with Dr. Baptiste, who will admit the patient for further monitoring, evaluation, and treatment.     Impression & Plan      Medical Decision Making:  Abbi Crowe is a 84 year old female who was found to have multiple syncopal episodes, which I felt were due to hypovolemia and dehydration from her vomiting/diarrhea illness. I was concerned about a contagious vomiting/diarrhea illness, such as Noro Virus or C difficile infection, since she was recently exposed to \"a stomach flu\" that has been going around her 's health care facility. I ordered stool studies and IV fluid hydration. She has a benign abdominal exam without any abdominal pain or tenderness. She is also afebrile, so I did not feel that advanced imaging of her abdomen was indicated. She has a minor closed head injury with a forehead contusion, but there is no intracranial bleed or skull fracture on CT. She is neurologically normal without any concerns for stroke symptoms or cardiac problems. She was admitted to the hospital under the care of Dr. Baptiste.       Diagnosis:    ICD-10-CM    1. Vasovagal syncope R55    2. Vomiting and diarrhea R11.10     R19.7    3. Closed head injury, initial encounter S09.90XA    4. Dehydration E86.0        Disposition:  Admitted to Dr. Baptiste.       Cynthia LIRA, am serving as a scribe at 8:20 AM on 3/7/2020 to document services personally performed by Vashti Kunz MD, based on my observations and the provider's statements to me.       Vashti Kunz, " MD  03/07/20 1516       Vashti Kunz MD  03/07/20 0280

## 2020-03-08 PROBLEM — R50.9 FEVER: Status: ACTIVE | Noted: 2020-01-01

## 2020-03-08 NOTE — PLAN OF CARE
Pt discharging at this time accompanied by pt's son. AVS and education given. Questions answered. Pt discharging home with all her belongings.

## 2020-03-08 NOTE — PROGRESS NOTES
Observation Goals:    - Nausea/vomiting (diarrhea if present) improved. - not met  - Tolerating oral intake to maintain hydration - partially met  - Vital signs normal or at patient baseline and orthostatic vitals are normal and patient not lightheaded with standing - met  - Diagnostic tests and consults completed and resulted if ordered - not met  - Adequate pain control on oral analgesia - met  - Tolerating oral antibiotics if ordered - NA  - Safe disposition plan has been identified - not met

## 2020-03-08 NOTE — CONSULTS
[facility]    Cardiac Electrophysiology Consultation     Date of Admission:  3/7/2020  Date of Consult (When I saw the patient): 03/08/20    Assessment & Plan   Abbi Crowe is a 84 year old female who was admitted on 3/7/2020. I was asked to see the patient for recurrent syncope. H/o severe aortic stenosis, s/p 21 mm Magna Ortega tissue AVR and single CABG with SVG to RCA in 2007 and has been followed closely by Dr. 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 Additionally, pt is known to have hyperdynamic LV along with chronic LORENZO for the last few years. Her last visit with Dr. Parikh was back 11/18 for this issue. Pt is known to have a LBBB but had unremarkable 48h Holter and nuclear stress test. Her LORENZO is unchanged since then.     Pt recently visited her  at a [facility]. She was advised not to visit him due to outbreak of diarrheal illness at that facility. Unfortunately, last night it appears that she had the same explosive watery diarrheal illness with frequent bathroom trips. During one of these trips she felt quite nauseated and had a bout of emesis and the next thing she realized was finding herself on the floor. She had another episode later. Initial BP was as low as 81 systolic. No arrhythmias noted overnight. Hydration given.    Echo also was done which was of very low quality unable to visualize aortic valve fully. Reportedly aortic gradient is 32 mmHg but this could be from LV gradient as well. Since LORENZO which is only when she is in a hurry further eval can be done outpt by Dr. Stearns.     Regarding her recurrent syncope it appears to be vasovagal nature given prodromal sxs of severe nausea and emesis worsened by hypovolumia. However, given her LBBB one cannot r/o primary cardioinhibition as primary cause. Recommending 30 days event monitor upon discharge.    Edwin Harris    Code Status    DNR/DNI    Primary Care Physician   Akira Ledezma  Anderson    History is obtained from the patient    Past Medical History   I have reviewed this patient's medical history and updated it with pertinent information if needed.   Past Medical History:   Diagnosis Date     Allergic rhinitis, cause unspecified     Allergic rhinitis     Aortic valve prosthesis present 2007    ANW     CAD (coronary artery disease)      Hyperlipidaemia LDL goal < 100      Insomnia      Mixed hyperlipidemia     Hyperlipidemia     Postmenopause     in early 51     Sciatica      Thrombocytopenia (H) 6/3/2018     Unspecified essential hypertension     Essential hypertension     Unspecified hypothyroidism     Hypothyroidism       Past Surgical History   I have reviewed this patient's surgical history and updated it with pertinent information if needed.  Past Surgical History:   Procedure Laterality Date     AORTIC VALVE REPLACEMENT      1 vessel CABG     D & C       PHACOEMULSIFICATION CLEAR CORNEA WITH STANDARD INTRAOCULAR LENS IMPLANT  6/6/2012    Procedure:PHACOEMULSIFICATION CLEAR CORNEA WITH STANDARD INTRAOCULAR LENS IMPLANT; LEFT PHACOEMULSIFICATION CLEAR CORNEA WITH STANDARD INTRAOCULAR LENS IMPLANT ; Surgeon:LUIS MANCINI; Location:[facility]       Prior to Admission Medications   Prior to Admission Medications   Prescriptions Last Dose Informant Patient Reported? Taking?   Multiple Vitamins-Minerals (PRESERVISION AREDS 2 PO) 3/6/2020 at Unknown time Self Yes Yes   Sig: Take 1 tablet by mouth 2 times daily   amoxicillin (AMOXIL) 500 MG capsule More than a month at Unknown time Self Yes No   Sig: Take 2,000 mg by mouth as needed Take 4 capsules by mouth 1 hour prior to dental appointments   aspirin 81 MG tablet 3/6/2020 at evening Self Yes Yes   Sig: Take 1 tablet by mouth every evening    atorvastatin (LIPITOR) 20 MG tablet 3/6/2020 at morning Self No Yes   Sig: Take 1 tablet (20 mg) by mouth daily   calcium-vitamin D (CALTRATE 600+D) 600-400 MG-UNIT per tablet 3/6/2020 at evening Self Yes  Yes   Sig: Take 1 tablet by mouth 2 times daily.   clopidogrel (PLAVIX) 75 MG tablet 3/6/2020 at Unknown time Self No Yes   Sig: Take 1 tablet (75 mg) by mouth daily   levothyroxine (SYNTHROID/LEVOTHROID) 50 MCG tablet 3/6/2020 at Unknown time Pharmacy No Yes   Sig: Take 1 tablet (50 mcg) by mouth daily   metoprolol succinate ER (TOPROL-XL) 50 MG 24 hr tablet 3/6/2020 at morning Pharmacy No Yes   Sig: Take 0.5 tablets (25 mg) by mouth daily   nitroGLYcerin (NITROSTAT) 0.4 MG sublingual tablet More than a month at Unknown time Self No No   Sig: For chest pain place 1 tablet under the tongue every 5 minutes for 3 doses. If symptoms persist 5 minutes after 1st dose call 911.   vitamin D3 (CHOLECALCIFEROL) 2000 units (50 mcg) tablet 3/6/2020 at morning Self Yes Yes   Sig: Take 1 tablet by mouth daily   zolpidem (AMBIEN) 10 MG tablet More than a month at Unknown time Self No No   Sig: Take 0.5 tablets (5 mg) by mouth nightly as needed for sleep      Facility-Administered Medications: None     Allergies   Allergies   Allergen Reactions     Lisinopril Cough     Yellow Dye Itching     Spoke with patient and she stated it happened 40 years ago, and she believes is no longer an issue.        Social History   I have reviewed this patient's social history and updated it with pertinent information if needed. Abbi Crowe  reports that she has never smoked. She has never used smokeless tobacco. She reports that she does not drink alcohol or use drugs.    Family History   I have reviewed this patient's family history and updated it with pertinent information if needed.   Family History   Problem Relation Age of Onset     Endocrine Disease Brother      Cancer Sister         esophageal      Heart Disease Brother      Osteoporosis Sister      Heart Disease Mother         CHF     Heart Disease Father        Review of Systems   Comprehensive review of systems was performed with pertinent positives and negatives listed in  assessment and plan section.    Physical Exam   Temp: 97.2  F (36.2  C) Temp src: Oral BP: 123/41 Pulse: 63   Resp: 16 SpO2: 94 % O2 Device: None (Room air)    Vital Signs with Ranges  Temp:  [97.2  F (36.2  C)-101.6  F (38.7  C)] 97.2  F (36.2  C)  Pulse:  [61-84] 63  Resp:  [16-18] 16  BP: (109-137)/(33-47) 123/41  SpO2:  [92 %-95 %] 94 %  150 lbs 4.8 oz    Constitutional: awake, alert, cooperative, no apparent distress, and appears stated age  Eyes: Lids and lashes normal, pupils equal, round and reactive to light, extra ocular muscles intact, sclera clear, conjunctiva normal. Mild ecchymoses around eye.  ENT: Normocephalic, without obvious abnormality, atraumatic, sinuses nontender on palpation, external ears without lesions, oral pharynx with moist mucous membranes, tonsils without erythema or exudates, gums normal and good dentition.  Hematologic / Lymphatic: no cervical lymphadenopathy  Respiratory: No increased work of breathing, good air exchange, clear to auscultation bilaterally, no crackles or wheezing  Cardiovascular: Normal apical impulse, regular rate and rhythm, normal S1 and S2, no S3 or S4, and no murmur noted  GI: No scars, normal bowel sounds, soft, non-distended, non-tender, no masses palpated, no hepatosplenomegally  Skin: no bruising or bleeding  Musculoskeletal: There is no redness, warmth, or swelling of the joints.  Full range of motion noted.  Motor strength is 5 out of 5 all extremities bilaterally.  Tone is normal.  Neurologic: Awake, alert,   Neuropsychiatric: General: normal, calm and normal eye contact    Data   I personally reviewed all recent ECGs and images.  Results for orders placed or performed during the hospital encounter of 03/07/20 (from the past 24 hour(s))   Echocardiogram Complete    Narrative    [id number]  [id number]  [id number]  [id number]^VANNESSA^LAILA^SHANNA           [facility]  Echocardiography Laboratory  [address] [place] [address]         Name: MEREDITH ELENA  MRN: [id number]  : 1935  Study Date: 2020 01:40 PM  Age: 84 yrs  Gender: Female  Patient Location: [facility]  Reason For Study: Syncope  Ordering Physician: LAILA HURD  Referring Physician: Akira Barron  Performed By: Elham Carrasco     BSA: 1.7 m2  Height: 63 in  Weight: 150 lb  HR: 80  BP: 138/52 mmHg  _____________________________________________________________________________  __        Procedure  Complete Portable Echo Adult. Optison (NDC #[id number]) given intravenously.  _____________________________________________________________________________  __        Interpretation Summary     Technically difficult, suboptimal images.     1. The bioprosthetic aortic valve was not well visualized. The mean systolic  gradient of 32 mmHg is abnormally high.  2. Asymmetric basal septal hypertrophy with dynamic intracavitary and dynamic  left ventricular outflow tract obstruction.  3. Trace mitral valve regurgitation.  4. Normal left ventricular systolic function. Estimated LVEF 65-70%.  5. Normal right ventricular size and systolic function.     COMMENT: There is dynamic intracavitary obstruction and abnormally high  prosthetic aortic valve gradient. Recommend additional imaging with a  transesophageal echocardiogram.     _____________________________________________________________________________  __        Left Ventricle  The left ventricle is normal in size. Left ventricular hypertrophy: asymmetric  with dynamic LVOT obstruction. An intracavitary gradient is present. Left  ventricular systolic function is normal. The visual ejection fraction is  estimated at 65-70%. Grade I or early diastolic dysfunction. No regional wall  motion abnormalities noted.     Right Ventricle  The right ventricle is normal in size and function.     Atria  The left atrium is mildly dilated. Right atrial size is normal. There is no  color Doppler evidence of an atrial shunt.     Mitral Valve  The  mitral valve leaflets appear normal. There is no evidence of stenosis,  fluttering, or prolapse. There is trace mitral regurgitation. There is mild  mitral stenosis.        Tricuspid Valve  The tricuspid valve is not well visualized. There is trace tricuspid  regurgitation. The right ventricular systolic pressure is approximated at 36.3  mmHg plus the right atrial pressure.     Aortic Valve  There is a bioprosthetic aortic valve. The valve opening cannot be assessed  due to imaging artifacts from the prosthesis. The obtained mean systolic  gradient of 32 mmHg is abnormally high.     Pulmonic Valve  The pulmonic valve is not well visualized. There is trace pulmonic valvular  regurgitation.     Vessels  The aortic root is normal size. Normal size ascending aorta. The inferior vena  cava is normal.     Pericardium  There is no pericardial effusion.        Rhythm  Sinus rhythm was noted.  _____________________________________________________________________________  __  MMode/2D Measurements & Calculations  IVSd: 1.9 cm     LVIDd: 3.7 cm  LVIDs: 2.6 cm  LVPWd: 1.2 cm  FS: 29.4 %  LV mass(C)d: 222.0 grams  LV mass(C)dI: 129.8 grams/m2  LA dimension: 3.3 cm  asc Aorta Diam: 3.5 cm  LVOT diam: 2.0 cm  LVOT area: 3.2 cm2  RWT: 0.67        Doppler Measurements & Calculations  MV E max armani: 74.2 cm/sec  MV A max armani: 100.4 cm/sec  MV E/A: 0.74  MV max P.1 mmHg  MV mean P.0 mmHg  MV V2 VTI: 25.2 cm  MVA(VTI): 3.5 cm2  MV P1/2t max armani: 89.8 cm/sec  MV P1/2t: 58.6 msec  MVA(P1/2t): 3.8 cm2  MV dec slope: 448.9 cm/sec2  Ao V2 max: 371.6 cm/sec  Ao max P.0 mmHg  Ao V2 mean: 262.7 cm/sec  Ao mean P.2 mmHg  Ao V2 VTI: 72.2 cm  ARSEN(I,D): 1.2 cm2  ARSEN(V,D): 1.1 cm2  AI P1/2t: 332.2 msec  LV V1 max P.5 mmHg  LV V1 max: 127.2 cm/sec  LV V1 VTI: 27.3 cm  SV(LVOT): 86.9 ml  SI(LVOT): 50.8 ml/m2     PA acc time: 0.07 sec  TR max armani: 301.3 cm/sec  TR max P.3 mmHg  AV Armani Ratio (DI): 0.34  ARSEN Index (cm2/m2):  0.70  E/E' avg: 15.2  Lateral E/e': 15.2  Medial E/e': 15.2           _____________________________________________________________________________  __           Report approved by: Dr Zechariah Peterson 03/07/2020 06:15 PM      Clostridium difficile toxin B PCR    Specimen: Feces   Result Value Ref Range    Specimen Description Feces     C Diff Toxin B PCR Negative NEG^Negative   Basic metabolic panel   Result Value Ref Range    Sodium 138 133 - 144 mmol/L    Potassium 3.9 3.4 - 5.3 mmol/L    Chloride 111 (H) 94 - 109 mmol/L    Carbon Dioxide 22 20 - 32 mmol/L    Anion Gap 5 3 - 14 mmol/L    Glucose 100 (H) 70 - 99 mg/dL    Urea Nitrogen 23 7 - 30 mg/dL    Creatinine 0.97 0.52 - 1.04 mg/dL    GFR Estimate 53 (L) >60 mL/min/[1.73_m2]    GFR Estimate If Black 62 >60 mL/min/[1.73_m2]    Calcium 8.6 8.5 - 10.1 mg/dL   Blood culture    Specimen: Blood    Left Arm   Result Value Ref Range    Specimen Description Blood Left Arm     Special Requests Received in aerobic bottle only     Culture Micro PENDING    Blood culture    Specimen: Blood    Right Arm   Result Value Ref Range    Specimen Description Blood Right Arm     Special Requests Received in aerobic bottle only     Culture Micro PENDING    Procalcitonin   Result Value Ref Range    Procalcitonin 2.32 ng/ml   CBC with platelets   Result Value Ref Range    WBC 7.1 4.0 - 11.0 10e9/L    RBC Count 3.76 (L) 3.8 - 5.2 10e12/L    Hemoglobin 11.5 (L) 11.7 - 15.7 g/dL    Hematocrit 34.5 (L) 35.0 - 47.0 %    MCV 92 78 - 100 fl    MCH 30.6 26.5 - 33.0 pg    MCHC 33.3 31.5 - 36.5 g/dL    RDW 14.7 10.0 - 15.0 %    Platelet Count 105 (L) 150 - 450 10e9/L

## 2020-03-08 NOTE — PLAN OF CARE
Pt arrived from ED at 1240hrs. A/OX4. Tele: SR w/ BBB. VSS on RA. Denies pain. Loose stool x1 this shift. Valente regular diet. SBA. Cardiology consulted.

## 2020-03-08 NOTE — PROGRESS NOTES
[facility]    Medicine History and Physical - Hospitalist Service       Date of Admission:  3/7/2020    Assessment & Plan   Abbi Crowe is a 84 year old female admitted on 3/7/2020. She presented for evaluation after two syncopal episodes and n/v/d x 2 days.     Concern for endocarditis.  T-max 101.6 F overnight in setting suspected viral gastroenteritis below however abnormal TTE with dynamic intracavitary obstruction and abnormally high prosthetic AV gradient.  Recent dental appointment Jan or Feb with 2 fillings placed, used prophylactic antibiotic and no dental/mouth pain.  No localizing pulmonary or urinary symptoms.  -Appreciate cardiology consultation for consideration HAKEEM.  -Blood cultures x2 obtained 3/8/2020.  -Given clinical stability will hold off on empiric antibiotics and await cultures and results of further evaluation.  -N.p.o. in event TTE can be performed today.    Suspected vasovagal syncope.  Closed head injury with facial contusion. Head CT negative.  2 episodes in setting nausea vomiting and diarrhea shortly before syncope.  Loud systolic murmur appreciated on exam in context of known bioprosthetic AVR.  Orthostatic vitals negative.  -TTE this admission showed dynamic intracavitary obstruction and abnormally high prosthetic AV gradient with recommendation for HAKEEM.  -Appreciate cardiology consultation for above as well as opinion on contribution to syncopal events.    Suspected viral gastroenteritis. - improving.  Exposure to sick contacts at [facility]s nursing home where an outbreak of diarrhea illness occurring.  Goal dose of amoxicillin in January or February for dental appointment.  No recent travel.  No concerning food intake.  -C. difficile and enteric panel pending.  Continue enteric precautions with ongoing diarrhea.  -Supportive cares.  Start gentle NS IV fluid while n.p.o.    Aortic valve stenosis s/p 21 mm Magna pericardial tissue bioprosthetic AVR (2007).  CAD  s/p 1v-CABG (SVG-RCA 2007).  Chronic LBBB.  Endorses chest pressure with exertion x1.5 years previously evaluated by [facility] cardiologist with negative nuc med stress testing and unrevealing TTE.     - Continue PTA, clopidogrel, metoprolol XL.  - Hold PTA statin while under observation status.    Macular degeneration.  Anxious to leave hospital by afternoon 3/9/2020 in order to make appointment for eye injection.  -Discuss need for evaluation of above.  Informed will discuss with care coordinator to help facilitate rescheduling if needed.    Hypothyroidism  - Continue prior to admission levothyroxine    Diet: Advance Diet as Tolerated: Regular Diet Adult; Regular Diet Adult  Baca Catheter: not present    DVT Prophylaxis: Ambulate every shift  Code Status: DNR/DNI    Expected discharge: 2 days pending fever trend, cardiology eval/recs, and blood cultures, recommended to prior living arrangement.  The patient's care was discussed with the Attending Physician, Dr. Marcano, Bedside Nurse, Care Coordinator/ and Patient.    JoAnna K. Barthell, PA-C  Hospitalist Service  [facility]    ______________________________________________________________________    Interval History   One episode diarrhea this AM. No abd pain and n/v resolved. Recent dental appointment 1-2 months ago and had two fills placed in front top 2 teeth. No recent uri, cough, urinary symptoms.    Tmax 101.6, vitals otherwise stable and within normal limits.    Data reviewed today: I reviewed all medications, new labs and imaging results over the last 24 hours. I personally reviewed no images or EKG's today.    Physical Exam   Vital Signs: Temp: 99.5  F (37.5  C) Temp src: Oral BP: (!) 122/33 Pulse: 61   Resp: 16 SpO2: 95 % O2 Device: None (Room air)    Weight: 150 lbs 4.8 oz  Constitutional: Appears stated age, no acute distress.  Respiratory: Breath sounds CTA. No increased work of breathing.  Cardiovascular: RRR.  Loud  systolic murmur.  No peripheral edema.  GI: Soft, non-tender, non-distended.  Skin: Warm, dry, no rashes or lesions.    Medications       aspirin  81 mg Oral QPM     clopidogrel  75 mg Oral Daily     levothyroxine  50 mcg Oral Daily     metoprolol succinate ER  25 mg Oral Daily     sodium chloride (PF)  3 mL Intracatheter Q8H     sodium chloride (PF)  3 mL Intracatheter Q8H       Data   Recent Labs   Lab 03/08/20  0631 03/07/20  0810   WBC  --  8.9   HGB  --  14.0   MCV  --  93   PLT  --  126*    141   POTASSIUM 3.9 4.6   CHLORIDE 111* 110*   CO2 22 27   BUN 23 26   CR 0.97 0.96   ANIONGAP 5 4   DELMI 8.6 9.4   * 138*       Imaging:  Recent Results (from the past 24 hour(s))   Head CT w/o contrast    Narrative    CT SCAN OF THE HEAD WITHOUT CONTRAST   3/7/2020 9:41 AM     HISTORY: Check for traumatic bleed or frontal skull fracture, syncope  with head injury.    TECHNIQUE:  Axial images of the head and coronal reformations without  IV contrast material. Radiation dose for this scan was reduced using  automated exposure control, adjustment of the mA and/or kV according  to patient size, or iterative reconstruction technique.    COMPARISON: None.    FINDINGS: Mild volume loss is present. White matter hypoattenuation  likely represents moderate chronic small vessel ischemic change. The  cerebral hemispheres, brainstem, and cerebellum otherwise demonstrate  normal morphology and attenuation. No evidence of acute ischemia,  hemorrhage, mass, mass effect or hydrocephalus. The visualized  calvarium, tympanic cavities, mastoid cavities, and extracranial soft  tissues are unremarkable. Left sphenoid sinus retention cyst noted.      Impression    IMPRESSION:  No acute intracranial abnormality.    BINA BARRIENTOS MD   Echocardiogram Complete    Narrative    [id number]  [id number]  [id number]  [id number]^VANNESSA^LAILA^SHANNA           [facility]  Echocardiography Laboratory  [address] [place] [address]  [id number]        Name: MEREDITH ELENA  MRN: [id number]  : 1935  Study Date: 2020 01:40 PM  Age: 84 yrs  Gender: Female  Patient Location: [facility]  Reason For Study: Syncope  Ordering Physician: LAILA HURD  Referring Physician: Akira Barron  Performed By: Elham Carrasco     BSA: 1.7 m2  Height: 63 in  Weight: 150 lb  HR: 80  BP: 138/52 mmHg  _____________________________________________________________________________  __        Procedure  Complete Portable Echo Adult. Optison (NDC #[id number]) given intravenously.  _____________________________________________________________________________  __        Interpretation Summary     Technically difficult, suboptimal images.     1. The bioprosthetic aortic valve was not well visualized. The mean systolic  gradient of 32 mmHg is abnormally high.  2. Asymmetric basal septal hypertrophy with dynamic intracavitary and dynamic  left ventricular outflow tract obstruction.  3. Trace mitral valve regurgitation.  4. Normal left ventricular systolic function. Estimated LVEF 65-70%.  5. Normal right ventricular size and systolic function.     COMMENT: There is dynamic intracavitary obstruction and abnormally high  prosthetic aortic valve gradient. Recommend additional imaging with a  transesophageal echocardiogram.     _____________________________________________________________________________  __        Left Ventricle  The left ventricle is normal in size. Left ventricular hypertrophy: asymmetric  with dynamic LVOT obstruction. An intracavitary gradient is present. Left  ventricular systolic function is normal. The visual ejection fraction is  estimated at 65-70%. Grade I or early diastolic dysfunction. No regional wall  motion abnormalities noted.     Right Ventricle  The right ventricle is normal in size and function.     Atria  The left atrium is mildly dilated. Right atrial size is normal. There is no  color Doppler evidence of an atrial shunt.     Mitral  Valve  The mitral valve leaflets appear normal. There is no evidence of stenosis,  fluttering, or prolapse. There is trace mitral regurgitation. There is mild  mitral stenosis.        Tricuspid Valve  The tricuspid valve is not well visualized. There is trace tricuspid  regurgitation. The right ventricular systolic pressure is approximated at 36.3  mmHg plus the right atrial pressure.     Aortic Valve  There is a bioprosthetic aortic valve. The valve opening cannot be assessed  due to imaging artifacts from the prosthesis. The obtained mean systolic  gradient of 32 mmHg is abnormally high.     Pulmonic Valve  The pulmonic valve is not well visualized. There is trace pulmonic valvular  regurgitation.     Vessels  The aortic root is normal size. Normal size ascending aorta. The inferior vena  cava is normal.     Pericardium  There is no pericardial effusion.        Rhythm  Sinus rhythm was noted.  _____________________________________________________________________________  __  MMode/2D Measurements & Calculations  IVSd: 1.9 cm     LVIDd: 3.7 cm  LVIDs: 2.6 cm  LVPWd: 1.2 cm  FS: 29.4 %  LV mass(C)d: 222.0 grams  LV mass(C)dI: 129.8 grams/m2  LA dimension: 3.3 cm  asc Aorta Diam: 3.5 cm  LVOT diam: 2.0 cm  LVOT area: 3.2 cm2  RWT: 0.67        Doppler Measurements & Calculations  MV E max armani: 74.2 cm/sec  MV A max armani: 100.4 cm/sec  MV E/A: 0.74  MV max P.1 mmHg  MV mean P.0 mmHg  MV V2 VTI: 25.2 cm  MVA(VTI): 3.5 cm2  MV P1/2t max armani: 89.8 cm/sec  MV P1/2t: 58.6 msec  MVA(P1/2t): 3.8 cm2  MV dec slope: 448.9 cm/sec2  Ao V2 max: 371.6 cm/sec  Ao max P.0 mmHg  Ao V2 mean: 262.7 cm/sec  Ao mean P.2 mmHg  Ao V2 VTI: 72.2 cm  ARSEN(I,D): 1.2 cm2  ARSEN(V,D): 1.1 cm2  AI P1/2t: 332.2 msec  LV V1 max P.5 mmHg  LV V1 max: 127.2 cm/sec  LV V1 VTI: 27.3 cm  SV(LVOT): 86.9 ml  SI(LVOT): 50.8 ml/m2     PA acc time: 0.07 sec  TR max armani: 301.3 cm/sec  TR max P.3 mmHg  AV Armani Ratio (DI): 0.34  ARSEN Index  (cm2/m2): 0.70  E/E' avg: 15.2  Lateral E/e': 15.2  Medial E/e': 15.2           _____________________________________________________________________________  __           Report approved by: Dr Zechariah Peterson 03/07/2020 06:15 PM

## 2020-03-08 NOTE — PROVIDER NOTIFICATION
Provider Notification    Notified Person: PA    Notified Person Name: Joanna Barthell    Notification Date/Time: 3/8 @ 1610    Notification Interaction: web text page    Purpose of Notification: + norovirus    Orders Received:    Comments:

## 2020-03-08 NOTE — PLAN OF CARE
T-max 101.6 down to 99.2 after tylenol; other VSS; orthostatic vitals negative; tele SR with BBB; pt denied dizziness or lightheadedness when up; stool culture sent for C-Diff and viral panel; pt's stool loose/watery/brown; back pain controlled with tylenol; Cardiology consult ordered.

## 2020-03-08 NOTE — PROGRESS NOTES
Observation goals  PRIOR TO DISCHARGE     Comments: List all goals to be met before discharge home     - Nausea/vomiting (diarrhea if present) improved: Met    - Tolerating oral intake to maintain hydration: Partially met    - Vital signs normal or at patient baseline and orthostatic vitals are normal and patient not lightheaded with standing: Partially met.    - Diagnostic tests and consults completed and resulted if ordered : Not met    - Adequate pain control on oral analgesia: Met     - Tolerating oral antibiotics if ordered: N/A    - Safe disposition plan has been identified: No    - Nurse to notify provider when observation goals have been met and patient is ready for discharge.

## 2020-03-08 NOTE — PROGRESS NOTES
Paged by RN to review echocardiogram results:     1. The bioprosthetic aortic valve was not well visualized. The mean systolic  gradient of 32 mmHg is abnormally high.  2. Asymmetric basal septal hypertrophy with dynamic intracavitary and dynamic  left ventricular outflow tract obstruction.  3. Trace mitral valve regurgitation.  4. Normal left ventricular systolic function. Estimated LVEF 65-70%.  5. Normal right ventricular size and systolic function.     COMMENT: There is dynamic intracavitary obstruction and abnormally high  prosthetic aortic valve gradient. Recommend additional imaging with a  transesophageal echocardiogram.    H&P reviewed. Given syncope and abnormal echo, will ask Cardiology to formally weigh in. Appreciate recommendations.

## 2020-03-08 NOTE — PROGRESS NOTES
Cross cover note    Notified of temp of 101.6.  Patient admitted for gastroenteritis. Other vitals are stable. Still having diarrhea. Suspect related to her diarrhea illness. Tylenol prn.

## 2020-03-08 NOTE — DISCHARGE SUMMARY
[facility]  Hospitalist Discharge Summary       Date of Admission:  3/7/2020  Date of Discharge:  3/8/2020  4:11 PM  Discharging Provider: JoAnna K. Barthell, PA-C    Discharge Diagnoses   Vasovagal / hypovolemia related syncope.  Norovirus.  Fever.  Abnormal TTE.  AVR s/p prior bioprosthetic AVR (2007).  CAD s/p single vessEL CABG (2007).  Chronic LBBB.  Follow-ups Needed After Discharge   Follow-up Appointments     Follow-up and recommended labs and tests       Follow up with primary provider on Friday as already arranged.    Call to schedule cardiology follow up to occur as soon as able. Inform   this is a hospital follow up and were told you have an abnormal   echocardiogram involving your heart valve.           Unresulted Labs Ordered in the Past 30 Days of this Admission     Date and Time Order Name Status Description    3/8/2020 0746 Blood culture Preliminary     3/8/2020 0746 Blood culture Preliminary       These results will be followed up by hospitalist / pcp.    Discharge Disposition   Discharged to home  Condition at discharge: Stable    Hospital Course   Abbi Crowe is a 84 year old female admitted on 3/7/2020. She presented for evaluation after two syncopal episodes and n/v/d x 2 days.     Concern for endocarditis.  T-max 101.6 F overnight in setting suspected viral gastroenteritis below, however abnormal TTE with dynamic intracavitary obstruction and abnormally high prosthetic AV gradient.  Recent dental appointment Jan or Feb with 2 fillings placed, used prophylactic antibiotic and no dental/mouth pain.  No localizing pulmonary or urinary symptoms.   -Blood cultures x2 obtained 3/8/2020.  -Empiric abx were not started given clinical stability and alternative plausible reason for fever.  -Cardiology was consulted. Informs patient known to have hyperdynamic LV. Echo this adm of very poor quality and abnl findings could be from LV gradient as well. Felt safe to discharge and  follow-up with primary cardiologist for further evaluation.  -Situation discussed in detail with patient and her son Garland. Ultimately shared decision making to discharge from hospital with understanding of strict return precautions.     Suspected vasovagal syncope.  Closed head injury with facial contusion. Head CT negative.  2 episodes in setting severe nausea, vomiting, and diarrhea shortly before syncope episodes.  Orthostatic vitals negative.  -TTE this admission showed dynamic intracavitary obstruction and abnormally high prosthetic AV gradient with recommendation for HAKEEM. Evaluated by cardiology, agreed with clinical diagnosis vasovagal etiology exacerbated by acute hypovolemia. Due to underlying LBBB, unable to rule out conduction pathology as cause and recommended 30 day event monitor.     Suspected viral gastroenteritis. - improving.  Exposure to sick contacts at 's nursing home where an outbreak of diarrhea illness occurring.  Goal dose of amoxicillin in January or February for dental appointment.  No recent travel.  No concerning food intake.  -C. difficile negative and enteric panel pending at time of discharge.    **After patient discharged, enteric panel returned positive for Norovirus. Attempted to call patient, but reached voicemail. Call and discussed with her son Garland who is listed as first emx contact and was also present during evaluation with patient on day of discharge. Reinforced patient education and prevention precautions as discussed earlier in day.     Aortic valve stenosis s/p 21 mm Magna pericardial tissue bioprosthetic AVR (2007).  CAD s/p 1v-CABG (SVG-RCA 2007).  Chronic LBBB.  Endorses chest pressure with exertion x1.5 years previously evaluated by [facility] cardiologist with negative nuc med stress testing and unrevealing TTE. Exertional chest pressure stable and unchanged occurring only when patient is in a hurry since aforementioned evaluation.    - Continues on PTA regimen  at observation discharge.  - As above, outpatient evaluation recommended with primary cardiologist for abnl echo.     Hypothyroidism  - Continues on PTA levothyroxine at observation discharge.    Consultations This Hospital Stay   CARDIOLOGY IP CONSULT    Code Status   DNR/DNI    Time Spent on this Encounter   I, JoAnna K. Barthell, PA-C, personally saw the patient today and spent greater than 30 minutes discharging this patient.       This patient was discussed with Dr. Marcano of the Hospitalist Service who agrees with current plans as outlined above.    JoAnna K. Barthell, PA-C  [facility]  ______________________________________________________________________  Physical Exam   Temp: 97.2  F (36.2  C) Temp src: Oral BP: 123/41 Pulse: 63   Resp: 16 SpO2: 94 % O2 Device: None (Room air)    Vitals:    03/07/20 0751 03/08/20 0605   Weight: 68 kg (150 lb) 68.2 kg (150 lb 4.8 oz)   Constitutional: Appears stated age, no acute distress. Well-appearing.  Respiratory: Breath sounds CTA. No increased work of breathing.  Cardiovascular: RRR. Loud systolic murmur. No peripheral edema.  GI: Soft, non-tender, non-distended.  Skin: Warm, dry, no rashes or lesions.       Primary Care Physician   Akira Barron    Discharge Orders      Reason for your hospital stay    Further evaluation and monitoring of loss of consciousness episode.     Follow-up and recommended labs and tests     Follow up with primary provider on Friday as already arranged.    Call to schedule cardiology follow up to occur as soon as able. Inform this is a hospital follow up and were told you have an abnormal echocardiogram involving your heart valve.     Activity    Your activity upon discharge: activity as tolerated     Discharge Instructions    BRAT diet -- banana, rice, applesauce, toast. Sip on fluids throughout the day to prevent dehydration. Until diarrhea fully resolved.    Return to ED if fever (>100.4 F), inability to tolerate  oral intake, chest pain, difficulty breathing, recurrent episodes loss of consciousness, weakness.    Home with event monitor.     DNR/DNI     Diet    Follow this diet upon discharge:    Low Saturated Fat Na <2400 mg       Significant Results and Procedures   Most Recent 3 CBC's:  Recent Labs   Lab Test 03/08/20  0809 03/07/20  0810 09/26/19  0859   WBC 7.1 8.9 4.4   HGB 11.5* 14.0 13.2   MCV 92 93 90.5   * 126* 129*     Most Recent 3 BMP's:  Recent Labs   Lab Test 03/08/20  0631 03/07/20  0810 09/26/19  1004    141 142   POTASSIUM 3.9 4.6 5.1   CHLORIDE 111* 110* 104   CO2 22 27  --    BUN 23 26 19 19   CR 0.97 0.96 1.01*   ANIONGAP 5 4  --    DELMI 8.6 9.4 10.0   * 138* 94     Most Recent 2 LFT's:  Recent Labs   Lab Test 09/26/19  1004 09/25/18  0926   AST 15 14   ALT 11 13   ALKPHOS 63 56   BILITOTAL 0.6 0.6     Results for orders placed or performed during the hospital encounter of 03/07/20   Head CT w/o contrast    Narrative    CT SCAN OF THE HEAD WITHOUT CONTRAST   3/7/2020 9:41 AM     HISTORY: Check for traumatic bleed or frontal skull fracture, syncope  with head injury.    TECHNIQUE:  Axial images of the head and coronal reformations without  IV contrast material. Radiation dose for this scan was reduced using  automated exposure control, adjustment of the mA and/or kV according  to patient size, or iterative reconstruction technique.    COMPARISON: None.    FINDINGS: Mild volume loss is present. White matter hypoattenuation  likely represents moderate chronic small vessel ischemic change. The  cerebral hemispheres, brainstem, and cerebellum otherwise demonstrate  normal morphology and attenuation. No evidence of acute ischemia,  hemorrhage, mass, mass effect or hydrocephalus. The visualized  calvarium, tympanic cavities, mastoid cavities, and extracranial soft  tissues are unremarkable. Left sphenoid sinus retention cyst noted.      Impression    IMPRESSION:  No acute intracranial  abnormality.    BINA BARRIENTOS MD   Echocardiogram Complete    Narrative    [id number]  [id number]  [id number]  [id number]^VANNESSA^LAILA^SHANNA           [facility]  Echocardiography Laboratory  [address] [place] [address]        Name: MEREDITH ELENA  MRN: [id number]  : 1935  Study Date: 2020 01:40 PM  Age: 84 yrs  Gender: Female  Patient Location: [facility]  Reason For Study: Syncope  Ordering Physician: LAILA HURD  Referring Physician: Akira Barron  Performed By: Elham Carrasco     BSA: 1.7 m2  Height: 63 in  Weight: 150 lb  HR: 80  BP: 138/52 mmHg  _____________________________________________________________________________  __        Procedure  Complete Portable Echo Adult. Optison (NDC #0115-1889) given intravenously.  _____________________________________________________________________________  __        Interpretation Summary     Technically difficult, suboptimal images.     1. The bioprosthetic aortic valve was not well visualized. The mean systolic  gradient of 32 mmHg is abnormally high.  2. Asymmetric basal septal hypertrophy with dynamic intracavitary and dynamic  left ventricular outflow tract obstruction.  3. Trace mitral valve regurgitation.  4. Normal left ventricular systolic function. Estimated LVEF 65-70%.  5. Normal right ventricular size and systolic function.     COMMENT: There is dynamic intracavitary obstruction and abnormally high  prosthetic aortic valve gradient. Recommend additional imaging with a  transesophageal echocardiogram.     _____________________________________________________________________________  __        Left Ventricle  The left ventricle is normal in size. Left ventricular hypertrophy: asymmetric  with dynamic LVOT obstruction. An intracavitary gradient is present. Left  ventricular systolic function is normal. The visual ejection fraction is  estimated at 65-70%. Grade I or early diastolic dysfunction. No regional  wall  motion abnormalities noted.     Right Ventricle  The right ventricle is normal in size and function.     Atria  The left atrium is mildly dilated. Right atrial size is normal. There is no  color Doppler evidence of an atrial shunt.     Mitral Valve  The mitral valve leaflets appear normal. There is no evidence of stenosis,  fluttering, or prolapse. There is trace mitral regurgitation. There is mild  mitral stenosis.        Tricuspid Valve  The tricuspid valve is not well visualized. There is trace tricuspid  regurgitation. The right ventricular systolic pressure is approximated at 36.3  mmHg plus the right atrial pressure.     Aortic Valve  There is a bioprosthetic aortic valve. The valve opening cannot be assessed  due to imaging artifacts from the prosthesis. The obtained mean systolic  gradient of 32 mmHg is abnormally high.     Pulmonic Valve  The pulmonic valve is not well visualized. There is trace pulmonic valvular  regurgitation.     Vessels  The aortic root is normal size. Normal size ascending aorta. The inferior vena  cava is normal.     Pericardium  There is no pericardial effusion.        Rhythm  Sinus rhythm was noted.  _____________________________________________________________________________  __  MMode/2D Measurements & Calculations  IVSd: 1.9 cm     LVIDd: 3.7 cm  LVIDs: 2.6 cm  LVPWd: 1.2 cm  FS: 29.4 %  LV mass(C)d: 222.0 grams  LV mass(C)dI: 129.8 grams/m2  LA dimension: 3.3 cm  asc Aorta Diam: 3.5 cm  LVOT diam: 2.0 cm  LVOT area: 3.2 cm2  RWT: 0.67        Doppler Measurements & Calculations  MV E max lew: 74.2 cm/sec  MV A max lew: 100.4 cm/sec  MV E/A: 0.74  MV max P.1 mmHg  MV mean P.0 mmHg  MV V2 VTI: 25.2 cm  MVA(VTI): 3.5 cm2  MV P1/2t max lew: 89.8 cm/sec  MV P1/2t: 58.6 msec  MVA(P1/2t): 3.8 cm2  MV dec slope: 448.9 cm/sec2  Ao V2 max: 371.6 cm/sec  Ao max P.0 mmHg  Ao V2 mean: 262.7 cm/sec  Ao mean P.2 mmHg  Ao V2 VTI: 72.2 cm  ARSEN(I,D): 1.2 cm2  ARSEN(V,D): 1.1  cm2  AI P1/2t: 332.2 msec  LV V1 max P.5 mmHg  LV V1 max: 127.2 cm/sec  LV V1 VTI: 27.3 cm  SV(LVOT): 86.9 ml  SI(LVOT): 50.8 ml/m2     PA acc time: 0.07 sec  TR max armani: 301.3 cm/sec  TR max P.3 mmHg  AV Armani Ratio (DI): 0.34  ARSEN Index (cm2/m2): 0.70  E/E' avg: 15.2  Lateral E/e': 15.2  Medial E/e': 15.2           _____________________________________________________________________________  __           Report approved by: Dr Zechariah Peterson 2020 06:15 PM            Discharge Medications   Discharge Medication List as of 3/8/2020  3:42 PM      CONTINUE these medications which have NOT CHANGED    Details   amoxicillin (AMOXIL) 500 MG capsule Take 2,000 mg by mouth as needed Take 4 capsules by mouth 1 hour prior to dental appointments, R-2, Historical      aspirin 81 MG tablet Take 1 tablet by mouth every evening , Historical      atorvastatin (LIPITOR) 20 MG tablet Take 1 tablet (20 mg) by mouth daily, Disp-93 tablet, R-2, E-Prescribe      calcium-vitamin D (CALTRATE 600+D) 600-400 MG-UNIT per tablet Take 1 tablet by mouth 2 times daily., Historical      clopidogrel (PLAVIX) 75 MG tablet Take 1 tablet (75 mg) by mouth daily, Disp-90 tablet, R-3, E-Prescribe      levothyroxine (SYNTHROID/LEVOTHROID) 50 MCG tablet Take 1 tablet (50 mcg) by mouth daily, Disp-90 tablet, R-3, E-Prescribe      metoprolol succinate ER (TOPROL-XL) 50 MG 24 hr tablet Take 0.5 tablets (25 mg) by mouth daily, Disp-45 tablet, R-1, E-Prescribe      Multiple Vitamins-Minerals (PRESERVISION AREDS 2 PO) Take 1 tablet by mouth 2 times daily, Historical      nitroGLYcerin (NITROSTAT) 0.4 MG sublingual tablet For chest pain place 1 tablet under the tongue every 5 minutes for 3 doses. If symptoms persist 5 minutes after 1st dose call 911., Disp-25 tablet, R-3, E-Prescribe      vitamin D3 (CHOLECALCIFEROL) 2000 units (50 mcg) tablet Take 1 tablet by mouth daily, Historical      zolpidem (AMBIEN) 10 MG tablet Take 0.5 tablets (5 mg) by  mouth nightly as needed for sleep, Disp-45 tablet, R-3, Local Print           Allergies   Allergies   Allergen Reactions     Lisinopril Cough     Yellow Dye Itching     Spoke with patient and she stated it happened 40 years ago, and she believes is no longer an issue.

## 2020-03-08 NOTE — PROVIDER NOTIFICATION
Dr Christianson notified of pt's temp of 101.6; other VSS; orthostatic BPs/HRs negative; tylenol given. No further orders given.

## 2020-03-08 NOTE — PLAN OF CARE
Observation goals PRIOR TO DISCHARGE    Comments: List all goals to be met before discharge home      - Nausea/vomiting (diarrhea if present) improved: Met     - Tolerating oral intake to maintain hydration : Met     - Vital signs normal or at patient baseline and orthostatic vitals are normal and patient not lightheaded with standing : Met     - Diagnostic tests and consults completed and resulted if ordered : Partially met     - Adequate pain control on oral analgesia: Met     - Tolerating oral antibiotics if ordered : N/A     - Safe disposition plan has been identified : Yes     - Nurse to notify provider when observation goals have been met and patient is ready for discharge.

## 2020-03-13 NOTE — PROGRESS NOTES
SUBJECTIVE:                                                      Patient presents for Hospital Followup Visit:    Hospital:  [facility]      Date of Admission: 3/7/2020  Date of Discharge: 3/8/2020  Transitional Care Management Phone Call:   Reason(s) for Admission: Vasovagal / hypovolemia related syncope, Norovirus            Problems taking medications regularly:  None       Medication changes since discharge: None       Problems adhering to non-medication therapy:  None    Summary of hospitalization:  [facility] discharge summary reviewed  Diagnostic Tests/Treatments reviewed.  Follow up needed: going to see cards this afternoon  Other Healthcare Providers Involved in Patient s Care:         None  Update since discharge: improved.     ROS:  Constitutional, HEENT, cardiovascular, pulmonary, gi and gu systems are negative, except as otherwise noted.    OBJECTIVE:                                                      APPEARANCE: = Relaxed and in no distress  Conj/Eyelids = noninjected and lids and lashes are without inflammation  PERRLA/Irises = Pupils Round Reactive to Light and Irisis without inflammation  Neck = No anterior or posterior adenopathy appreciated.  Thyroid = Not enlarged and no masses felt  Resp effort = Calm regular breathing  Breath Sounds = Good air movement with no rales or rhonchi in any lung fields  Heart Rate, Rhythm, & sounds (no Murm)  = grade 4/6 sys murmur  Carotid Art's = Pulses full and equal and no bruits appreciated  Abdomen = Soft, nontender, no masses, & bowel sounds in all quadrants  Liver/Spleen = Normal span and no splenomegaly noted  Digits and Nails = FROM in all finger joints, no nail dystrophy  Ext (edema) = No pretibial edema noted or elsewhere  Musculsktl =  Strength and ROM of major joints are within normal limits  SKIN = absent significant rashes or lesions   Recent/Remote Memory = Alert and Oriented x 3  Mood/Affect = Cooperative and  interested        ASSESSMENT/PLAN:                                                      There are no diagnoses linked to this encounter.     Post Discharge Medication Reconciliation: discharge medications reconciled and changed, per note/orders (see AVS).  Issues to address: Issues identified were:   doing better..  Plan of care communicated with patient    Concern for endocarditis.  T-max 101.6 F overnight in setting suspected viral gastroenteritis below, however abnormal TTE with dynamic intracavitary obstruction and abnormally high prosthetic AV gradient.  Recent dental appointment Jan or Feb with 2 fillings placed, used prophylactic antibiotic and no dental/mouth pain.  No localizing pulmonary or urinary symptoms.   -Blood cultures x2 obtained 3/8/2020.  -Empiric abx were not started given clinical stability and alternative plausible reason for fever.  -Cardiology was consulted. Informs patient known to have hyperdynamic LV. Echo this adm of very poor quality and abnl findings could be from LV gradient as well. Felt safe to discharge and follow-up with primary cardiologist for further evaluation.  -Situation discussed in detail with patient and her son Garland. Ultimately shared decision making to discharge from hospital with understanding of strict return precautions.     Suspected vasovagal syncope.  Closed head injury with facial contusion. Head CT negative.  2 episodes in setting severe nausea, vomiting, and diarrhea shortly before syncope episodes.  Orthostatic vitals negative.  -TTE this admission showed dynamic intracavitary obstruction and abnormally high prosthetic AV gradient with recommendation for HAKEEM. Evaluated by cardiology, agreed with clinical diagnosis vasovagal etiology exacerbated by acute hypovolemia. Due to underlying LBBB, unable to rule out conduction pathology as cause and recommended 30 day event monitor.     Suspected viral gastroenteritis. - improving.  Exposure to sick contacts at  's nursing home where an outbreak of diarrhea illness occurring.  Goal dose of amoxicillin in January or February for dental appointment.  No recent travel.  No concerning food intake.  -C. difficile negative and enteric panel pending at time of discharge.     **After patient discharged, enteric panel returned positive for Norovirus. Attempted to call patient, but reached voicemail. Call and discussed with her son Garland who is listed as first emx contact and was also present during evaluation with patient on day of discharge. Reinforced patient education and prevention precautions as discussed earlier in day.     Aortic valve stenosis s/p 21 mm Magna pericardial tissue bioprosthetic AVR (2007).  CAD s/p 1v-CABG (SVG-RCA 2007).  Chronic LBBB.  Endorses chest pressure with exertion x1.5 years previously evaluated by [facility] cardiologist with negative nuc med stress testing and unrevealing TTE. Exertional chest pressure stable and unchanged occurring only when patient is in a hurry since aforementioned evaluation.    - Continues on PTA regimen at observation discharge.  - As above, outpatient evaluation recommended with primary cardiologist for abnl echo.     Hypothyroidism  - Continues on PTA levothyroxine at observation discharge.

## 2020-04-06 NOTE — TELEPHONE ENCOUNTER
metoprolol succinate ER (TOPROL-XL) 50 MG 24 hr  Last OV - 3/13/2020    Last labs 3/8/2020    BP Readings from Last 3 Encounters:   03/13/20 120/82   03/08/20 123/41   10/03/19 124/76     Last Comprehensive Metabolic Panel:  Sodium   Date Value Ref Range Status   03/08/2020 138 133 - 144 mmol/L Final     Potassium   Date Value Ref Range Status   03/08/2020 3.9 3.4 - 5.3 mmol/L Final     Chloride   Date Value Ref Range Status   03/08/2020 111 (H) 94 - 109 mmol/L Final     Carbon Dioxide   Date Value Ref Range Status   03/08/2020 22 20 - 32 mmol/L Final     Anion Gap   Date Value Ref Range Status   03/08/2020 5 3 - 14 mmol/L Final     Glucose   Date Value Ref Range Status   03/08/2020 100 (H) 70 - 99 mg/dL Final     Urea Nitrogen   Date Value Ref Range Status   03/08/2020 23 7 - 30 mg/dL Final     Creatinine   Date Value Ref Range Status   03/08/2020 0.97 0.52 - 1.04 mg/dL Final     GFR Estimate   Date Value Ref Range Status   03/08/2020 53 (L) >60 mL/min/[1.73_m2] Final     Comment:     Non  GFR Calc  Starting 12/18/2018, serum creatinine based estimated GFR (eGFR) will be   calculated using the Chronic Kidney Disease Epidemiology Collaboration   (CKD-EPI) equation.       Calcium   Date Value Ref Range Status   03/08/2020 8.6 8.5 - 10.1 mg/dL Final

## 2020-04-09 NOTE — TELEPHONE ENCOUNTER
----- Message from Edwin Harris MD sent at 4/9/2020 11:24 AM CDT -----  No specific arrhythmias noted. Thanks, qp

## 2020-04-09 NOTE — TELEPHONE ENCOUNTER
Called patient and updated them that no arrhythmias were noted on their cardiac event monitor per Dr. Burden.  Pt verbalized understanding and was appreciative of call.     SAKINA Prather

## 2020-05-05 NOTE — TELEPHONE ENCOUNTER
atorvastatin (LIPITOR) 20 MG tablet     LOV:3/13/2020  LAST LAB:3/8/2020    Last Comprehensive Metabolic Panel:  Sodium   Date Value Ref Range Status   03/08/2020 138 133 - 144 mmol/L Final     Potassium   Date Value Ref Range Status   03/08/2020 3.9 3.4 - 5.3 mmol/L Final     Chloride   Date Value Ref Range Status   03/08/2020 111 (H) 94 - 109 mmol/L Final     Carbon Dioxide   Date Value Ref Range Status   03/08/2020 22 20 - 32 mmol/L Final     Anion Gap   Date Value Ref Range Status   03/08/2020 5 3 - 14 mmol/L Final     Glucose   Date Value Ref Range Status   03/08/2020 100 (H) 70 - 99 mg/dL Final     Urea Nitrogen   Date Value Ref Range Status   03/08/2020 23 7 - 30 mg/dL Final     Creatinine   Date Value Ref Range Status   03/08/2020 0.97 0.52 - 1.04 mg/dL Final     GFR Estimate   Date Value Ref Range Status   03/08/2020 53 (L) >60 mL/min/[1.73_m2] Final     Comment:     Non  GFR Calc  Starting 12/18/2018, serum creatinine based estimated GFR (eGFR) will be   calculated using the Chronic Kidney Disease Epidemiology Collaboration   (CKD-EPI) equation.       Calcium   Date Value Ref Range Status   03/08/2020 8.6 8.5 - 10.1 mg/dL Final